# Patient Record
Sex: FEMALE | Race: WHITE | NOT HISPANIC OR LATINO | Employment: UNEMPLOYED | URBAN - METROPOLITAN AREA
[De-identification: names, ages, dates, MRNs, and addresses within clinical notes are randomized per-mention and may not be internally consistent; named-entity substitution may affect disease eponyms.]

---
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
## 2022-05-24 NOTE — MR AVS SNAPSHOT
[facility] [place]  [address] [place] [address]  [place] GOMEZ [address]  Phone: [phone]  Fax: [phone]                  Tamica Ware   2017 10:00 AM   Office Visit    Description:  Female : 2000   Provider:  Meliza Coughlin MD   Department:  [place]  Peds           Reason for Visit     Headache     Fever     Cough     Generalized Body Aches           Diagnoses this Visit        Comments    Influenza A    -  Primary            To Do List           Future Appointments        Provider Department Dept Phone    2017 3:30 PM Hossein Jordan Jr., MD [facility] - Pediatric Oncology [phone]      Goals (5 Years of Data)     None       These Medications        Disp Refills Start End    oseltamivir (TAMIFLU) 75 MG capsule 20 capsule 0 2017 2/3/2017    Take 1 capsule (75 mg total) by mouth 2 (two) times daily. - Oral    Pharmacy: [facility]/pharmacy #[id number] - [place] - [address] [facility] #: [phone]         [facility]     [facility] -  Assistance  Registered nurses in the [facility] On Call Center provide clinical advisement, health education, appointment booking, and other advisory services.  Call for this free service at [phone].             Medications           Message regarding Medications     Verify the changes and/or additions to your medication regime listed below are the same as discussed with your clinician today.  If any of these changes or additions are incorrect, please notify your healthcare provider.        START taking these NEW medications        Refills    oseltamivir (TAMIFLU) 75 MG capsule 0    Sig: Take 1 capsule (75 mg total) by mouth 2 (two) times daily.    Class: Normal    Route: Oral           Verify that the below list of medications is an accurate representation of the medications you are currently taking.  If none reported, the list may be blank. If incorrect, please contact your healthcare provider. Carry this  "list with you in case of emergency.           Current Medications     cetirizine (ZYRTEC) 1 mg/mL syrup Take 10 mLs (10 mg total) by mouth once daily.    dextroamphetamine-amphetamine (ADDERALL XR) 20 MG 24 hr capsule Take 1 capsule (20 mg total) by mouth every morning.    ferrous sulfate 325 mg (65 mg iron) Tab tablet Take 1 tablet (325 mg total) by mouth daily with breakfast.    mupirocin (BACTROBAN) 2 % ointment Apply to affected skin bid-tid    oseltamivir (TAMIFLU) 75 MG capsule Take 1 capsule (75 mg total) by mouth 2 (two) times daily.           Clinical Reference Information           Vital Signs - Last Recorded  Most recent update: 1/24/2017 10:16 AM by Marie Fleming MA    Temp Ht Wt LMP BMI    98.4 °F (36.9 °C) (Oral) 5' 7.05" (1.703 m) (93 %, Z= 1.45)* 59.6 kg (131 lb 6.4 oz) (80 %, Z= 0.86)* 12/29/2016 20.55 kg/m2 (64 %, Z= 0.35)*    *Growth percentiles are based on CDC 2-20 Years data.      Allergies as of 1/24/2017     No Known Allergies      Immunizations Administered on Date of Encounter - 1/24/2017     None      [facility] Proxy Access     For Parents with an Active [facility] Account, Getting Proxy Access to Your Child's Record is Easy!     Ask your provider's office to meliza you access.    Or     1) Sign into your [facility] account.    2) Access the Pediatric Proxy Request form under My Account --> Personalize.    3) Fill out the form, and e-mail it to [email or web address], fax it to [phone], or mail it to [facility], [employer], [facility] 1st Floor, [address] [place] [address].      Don't have a [facility] account? Go to [email or web address], and click New User.     Additional Information  If you have questions, please e-mail [email or web address] or call [phone] to talk to our [facility] staff. Remember, [facility] is NOT to be used for urgent needs. For medical emergencies, dial 911.         Instructions      Influenza (Child)    Influenza is also called the flu. It is a " viral illness that affects the air passages of your lungs. It is different from the common cold. The flu can easily be passed from one to person to another. It may be spread through the air by coughing and sneezing. Or it can be spread by touching the sick person and then touching your own eyes, nose, or mouth.  Symptoms of the flu may be mild or severe. They can include extreme tiredness (wanting to stay in bed all day), chills, fevers, muscle aches, soreness with eye movement, headache, and a dry, hacking cough.  Your child usually wont need to take antibiotics, unless he or she has a complication. This might be an ear or sinus infection or pneumonia.  Home care  Follow these guidelines when caring for your child at home:  · Fluids. Fever increases the amount of water your child loses from his or her body. For babies younger than 1 year old, keep giving regular feedings (formula or breast). Talk with your childs healthcare provider to find out how much fluid your baby should be getting. If needed, give an oral rehydration solution. You can buy this at the grocery or drugstore without a prescription. For a child older than 1 year, give him or her more fluids and continue his or her normal diet. If your child is dehydrated, give an oral rehydration. Go back to your childs normal diet as soon as possible. If your child has diarrhea, dont give juice, flavored gelatin water, soft drinks without caffeine, lemonade, fruit drinks, or popsicles. This may make diarrhea worse.  · Food. If your child doesnt want to eat solid foods, its OK for a few days. Make sure your child drinks lots of fluid and has a normal amount of urine.  · Activity. Keep children with fever at home resting or playing quietly. Encourage your child to take naps. Your child may go back to  or school when the fever is gone for at least 24 hours. The fever should be gone without giving your child acetaminophen or other medicine to reduce  fever. Your child should also be eating well and feeling better.  · Sleep. Its normal for your child to be unable to sleep or be irritable if he or she has the flu. A child who has congestion will sleep best with his or her head and upper body raised up. Or you can raise the head of the bed frame on a 6-inch block.  · Cough. Coughing is a normal part of the flu. You can use a cool mist humidifier at the bedside. Dont give over-the-counter cough and cold medicines to children younger than 6 years of age, unless the healthcare provider tells you to do so. These medicines dont help ease symptoms. And they can cause serious side effects, especially in babies younger than 2 years of age. Dont allow anyone to smoke around your child. Smoke can make the cough worse.  · Nasal congestion. Use a rubber bulb syringe to suction the nose of a baby. You may put 2 to 3 drops of saltwater (saline) nose drops in each nostril before suctioning. This will help remove secretions. You can buy saline nose drops without a prescription. You can make the drops yourself by adding 1/4 teaspoon table salt to 1 cup of water.  · Fever. Use acetaminophen to control pain, unless another medicine was prescribed. In infants older than 6 months of age, you may use ibuprofen instead of acetaminophen. If your child has chronic liver or kidney disease, talk with your childs provider before using these medicines. Also talk with the provider if your child has ever had a stomach ulcer or GI bleeding. Dont give aspirin to anyone under 18 years of age who is ill with a fever. It may cause severe liver damage.  Follow-up care  Follow up with your childs health care provider, or as advised.  When to seek medical advice  Call your childs healthcare provider right away if any of these occur:  · Your child is younger than 12 weeks old and has a fever of 100.4°F (38°C) or higher. Your baby may need to be seen by a healthcare provider.  · Your child has  "repeated fevers above 104°F (40°C) at any age.  · Your child is younger than 2 years old and his or her fever continues for more than 24 hours. Or your child is 2 years old or older and his or her fever continues for more than 3 days.  · Fast breathing. In a child 6 weeks to 2 years, this is more than 45 breaths per minute. In a child 3 to 6 years, this is more than 35 breaths per minute. In a child 7 to 10 years, this is more than 30 breaths per minute. In a child older than 10 years, this is more than  25 breaths per minute.  · Earache, sinus pain, stiff or painful neck, headache, or repeated diarrhea or vomiting  · Unusual fussiness, drowsiness, or confusion  · Your child doesnt interact with you as he or she normally does  · Your child doesnt want to be held  · Not drinking enough fluid. This may show as no tears when crying, or "sunken" eyes or dry mouth. It may also be no wet diapers for 8 hours in a baby. Or it may be less urine than usual in older children.  · Rash with fever  © 3461-3172 [employer]. [address] [place] [address], [place] [address]. All rights reserved. This information is not intended as a substitute for professional medical care. Always follow your healthcare professional's instructions.             " no ear pain/no tinnitus/no vertigo/no nose bleeds/no gum bleeding/no dry mouth/no throat pain no ear pain/no tinnitus/no vertigo/no nose bleeds/no gum bleeding/no dry mouth/no throat pain no ear pain/no tinnitus/no vertigo/no nose bleeds/no gum bleeding/no dry mouth/no throat pain no ear pain/no tinnitus/no vertigo/no nose bleeds/no gum bleeding/no dry mouth/no throat pain no ear pain/no tinnitus/no vertigo/no nose bleeds/no gum bleeding/no dry mouth/no throat pain no ear pain/no tinnitus/no vertigo/no nose bleeds/no gum bleeding/no dry mouth/no throat pain no ear pain/no tinnitus/no vertigo/no nose bleeds/no gum bleeding/no dry mouth/no throat pain no ear pain/no tinnitus/no vertigo/no nose bleeds/no gum bleeding/no dry mouth/no throat pain no ear pain/no tinnitus/no vertigo/no nose bleeds/no gum bleeding/no dry mouth/no throat pain no ear pain/no tinnitus/no vertigo/no nose bleeds/no gum bleeding/no dry mouth/no throat pain no ear pain/no tinnitus/no vertigo/no nose bleeds/no gum bleeding/no dry mouth/no throat pain no ear pain/no tinnitus/no vertigo/no nose bleeds/no gum bleeding/no dry mouth/no throat pain

## 2022-08-10 ENCOUNTER — TELEPHONE (OUTPATIENT)
Dept: OBSTETRICS AND GYNECOLOGY | Facility: CLINIC | Age: 22
End: 2022-08-10
Payer: MEDICAID

## 2022-08-10 NOTE — TELEPHONE ENCOUNTER
----- Message from Jesica Sharma sent at 8/10/2022 10:00 AM CDT -----  Good morning,    The patient has a STAT referral from the emergency department with a diagnosis of Chronic ITP (idiopathic thrombocytopenia)/Less than 8 weeks gestation of pregnancy. Please assist with scheduling the patient?    Thank you

## 2022-08-11 ENCOUNTER — TELEPHONE (OUTPATIENT)
Dept: MATERNAL FETAL MEDICINE | Facility: CLINIC | Age: 22
End: 2022-08-11
Payer: MEDICAID

## 2022-08-11 NOTE — TELEPHONE ENCOUNTER
Message left for pt to call MFM clinic at [phone] regarding consult.     Patient will need to establish OB care before MFM consult is scheduled.

## 2022-08-15 ENCOUNTER — TELEPHONE (OUTPATIENT)
Dept: OBSTETRICS AND GYNECOLOGY | Facility: CLINIC | Age: 22
End: 2022-08-15
Payer: MEDICAID

## 2022-08-15 NOTE — TELEPHONE ENCOUNTER
----- Message from Roma Odom sent at 8/15/2022 11:52 AM CDT -----  Type:  Needs Medical Advice    Who Called:  pt  Symptoms (please be specific):  Initial Ob visit possibly 7 weeks Pregnancy confirmed At Urgent care      Would the patient rather a call back or a response via [facility]?  Call   Best Call Back Number:  [phone]  Additional Information:  pt would like to get established with Dr. Espinal if possible and would like to be seen as soon as possible /an appt was scheduled for pt to see Dr. Forrest in [facility] but she prefers to see Dr. Espinal sooner than September visit   Pt said she has chronic ITP and was told that she would possibly be a  High risk pregnancy  for that reason

## 2022-09-20 ENCOUNTER — OFFICE VISIT (OUTPATIENT)
Dept: OBSTETRICS AND GYNECOLOGY | Facility: CLINIC | Age: 22
End: 2022-09-20
Payer: MEDICAID

## 2022-09-20 ENCOUNTER — TELEPHONE (OUTPATIENT)
Dept: OBSTETRICS AND GYNECOLOGY | Facility: CLINIC | Age: 22
End: 2022-09-20
Payer: MEDICAID

## 2022-09-20 ENCOUNTER — PATIENT MESSAGE (OUTPATIENT)
Dept: OBSTETRICS AND GYNECOLOGY | Facility: CLINIC | Age: 22
End: 2022-09-20

## 2022-09-20 VITALS
WEIGHT: 141.13 LBS | DIASTOLIC BLOOD PRESSURE: 74 MMHG | SYSTOLIC BLOOD PRESSURE: 126 MMHG | BODY MASS INDEX: 20.24 KG/M2

## 2022-09-20 DIAGNOSIS — D69.3 CHRONIC ITP (IDIOPATHIC THROMBOCYTOPENIA): Primary | ICD-10-CM

## 2022-09-20 DIAGNOSIS — Z3A.12 12 WEEKS GESTATION OF PREGNANCY: Primary | ICD-10-CM

## 2022-09-20 DIAGNOSIS — D69.3 CHRONIC ITP (IDIOPATHIC THROMBOCYTOPENIA): ICD-10-CM

## 2022-09-20 DIAGNOSIS — Z3A.12 12 WEEKS GESTATION OF PREGNANCY: ICD-10-CM

## 2022-09-20 DIAGNOSIS — F32.A DEPRESSION, UNSPECIFIED DEPRESSION TYPE: ICD-10-CM

## 2022-09-20 DIAGNOSIS — F41.9 ANXIETY: ICD-10-CM

## 2022-09-20 PROCEDURE — 3074F SYST BP LT 130 MM HG: CPT | Mod: CPTII,,, | Performed by: OBSTETRICS & GYNECOLOGY

## 2022-09-20 PROCEDURE — 99204 OFFICE O/P NEW MOD 45 MIN: CPT | Mod: S$PBB,TH,, | Performed by: OBSTETRICS & GYNECOLOGY

## 2022-09-20 PROCEDURE — 1160F RVW MEDS BY RX/DR IN RCRD: CPT | Mod: CPTII,,, | Performed by: OBSTETRICS & GYNECOLOGY

## 2022-09-20 PROCEDURE — 87491 CHLMYD TRACH DNA AMP PROBE: CPT | Mod: 59 | Performed by: OBSTETRICS & GYNECOLOGY

## 2022-09-20 PROCEDURE — 1160F PR REVIEW ALL MEDS BY PRESCRIBER/CLIN PHARMACIST DOCUMENTED: ICD-10-PCS | Mod: CPTII,,, | Performed by: OBSTETRICS & GYNECOLOGY

## 2022-09-20 PROCEDURE — 3008F BODY MASS INDEX DOCD: CPT | Mod: CPTII,,, | Performed by: OBSTETRICS & GYNECOLOGY

## 2022-09-20 PROCEDURE — 3008F PR BODY MASS INDEX (BMI) DOCUMENTED: ICD-10-PCS | Mod: CPTII,,, | Performed by: OBSTETRICS & GYNECOLOGY

## 2022-09-20 PROCEDURE — 3074F PR MOST RECENT SYSTOLIC BLOOD PRESSURE < 130 MM HG: ICD-10-PCS | Mod: CPTII,,, | Performed by: OBSTETRICS & GYNECOLOGY

## 2022-09-20 PROCEDURE — 99204 PR OFFICE/OUTPT VISIT, NEW, LEVL IV, 45-59 MIN: ICD-10-PCS | Mod: S$PBB,TH,, | Performed by: OBSTETRICS & GYNECOLOGY

## 2022-09-20 PROCEDURE — 1159F PR MEDICATION LIST DOCUMENTED IN MEDICAL RECORD: ICD-10-PCS | Mod: CPTII,,, | Performed by: OBSTETRICS & GYNECOLOGY

## 2022-09-20 PROCEDURE — 99999 PR PBB SHADOW E&M-EST. PATIENT-LVL III: ICD-10-PCS | Mod: PBBFAC,,, | Performed by: OBSTETRICS & GYNECOLOGY

## 2022-09-20 PROCEDURE — 1159F MED LIST DOCD IN RCRD: CPT | Mod: CPTII,,, | Performed by: OBSTETRICS & GYNECOLOGY

## 2022-09-20 PROCEDURE — 99213 OFFICE O/P EST LOW 20 MIN: CPT | Mod: PBBFAC,PN | Performed by: OBSTETRICS & GYNECOLOGY

## 2022-09-20 PROCEDURE — 87481 CANDIDA DNA AMP PROBE: CPT | Mod: 59 | Performed by: OBSTETRICS & GYNECOLOGY

## 2022-09-20 PROCEDURE — 87591 N.GONORRHOEAE DNA AMP PROB: CPT | Mod: 59 | Performed by: OBSTETRICS & GYNECOLOGY

## 2022-09-20 PROCEDURE — 3078F DIAST BP <80 MM HG: CPT | Mod: CPTII,,, | Performed by: OBSTETRICS & GYNECOLOGY

## 2022-09-20 PROCEDURE — 3078F PR MOST RECENT DIASTOLIC BLOOD PRESSURE < 80 MM HG: ICD-10-PCS | Mod: CPTII,,, | Performed by: OBSTETRICS & GYNECOLOGY

## 2022-09-20 PROCEDURE — 99999 PR PBB SHADOW E&M-EST. PATIENT-LVL III: CPT | Mod: PBBFAC,,, | Performed by: OBSTETRICS & GYNECOLOGY

## 2022-09-20 PROCEDURE — 87086 URINE CULTURE/COLONY COUNT: CPT | Performed by: OBSTETRICS & GYNECOLOGY

## 2022-09-20 NOTE — PROGRESS NOTES
CC: positive pregnancy test     HPI:   Tamica Ware 21 y.o.  at 12 wga by lmp is here for + UPT. She hasn't seen anyone yet for this pregnancy. She complains of.     OB history: g1     Patient's last menstrual period was 2022.     Past Medical History:   Diagnosis Date    Suicidal ideations     Thrombocytopenia     Urinary tract infection        Past Surgical History:   Procedure Laterality Date    TONSILLECTOMY         Family History   Problem Relation Age of Onset    Breast cancer Neg Hx     Colon cancer Neg Hx     Ovarian cancer Neg Hx        Social History     Socioeconomic History    Marital status: Single   Tobacco Use    Smoking status: Former     Types: Vaping with nicotine     Start date: 2019    Smokeless tobacco: Never   Substance and Sexual Activity    Alcohol use: Never    Drug use: Never    Sexual activity: Yes     Partners: Male   Social History Narrative    Pt lives at home with both parents, sister Lisa and brother Farideh        Not in school at the moment, trying to get into college    1 dog       OB History          1    Para   0    Term   0       0    AB   0    Living   0         SAB   0    IAB   0    Ectopic   0    Multiple   0    Live Births   0                  COMPREHENSIVE GYN HISTORY:  PAP History: Denies abnormal Paps.  Infection History: Denies STDs. Denies PID.  Benign History: Denies uterine fibroids. Denies ovarian cysts. Denies endometriosis.   Cancer History: Denies cervical cancer. Denies uterine cancer or hyperplasia. Denies ovarian cancer. Denies vulvar cancer or pre-cancer. Denies vaginal cancer or pre-cancer. Denies breast cancer. Denies colon cancer.  Sexual Activity History:   reports being sexually active and has had partner(s) who are male.   Menstrual History: Age of menarche: 17 years. Every mon days, flows for 7d days. heavy flow.  Dysmenorrhea History: Reports mod dysmenorrhea.  Contraception: none       ROS:  GENERAL: Denies weight gain  or weight loss. Feeling well overall.   SKIN: Denies rash or lesions.   HEAD: Denies headache.   NODES: Denies enlarged lymph nodes.   CHEST: Denies shortness of breath or chest pain   ABDOMEN: No abdominal pain, constipation, diarrhea, nausea, vomiting or rectal bleeding.   URINARY: No frequency, dysuria, hematuria, or burning on urination.  REPRODUCTIVE: See HPI.   BREASTS: The patient denies pain, lumps, or nipple discharge.   HEMATOLOGIC: No easy bruisability.   MUSCULOSKELETAL: Denies joint pain or back pain.   NEUROLOGIC: Denies weakness.   PSYCHIATRIC: Denies depression, anxiety or mood swings.    PE:   /74   Wt 64 kg (141 lb 1.5 oz)   LMP 06/23/2022   BMI 20.24 kg/m²     APPEARANCE: Well nourished, well developed, in no acute distress.  NECK: Neck symmetric without  thyromegaly.  NODES: No inguinal, cervical lymph node enlargement.  CHEST: Lungs clear to auscultation.  HEART: Regular rate and rhythm, no murmurs, rubs or gallops.  ABDOMEN: Soft. No tenderness or masses. No hernias.  BREASTS: Symmetrical, no skin changes or visible lesions. No palpable masses, nipple discharge or adenopathy bilaterally.  PELVIC:   VULVA: No lesions. Normal female genitalia.  URETHRAL MEATUS: Normal size and location, no lesions, no prolapse.  URETHRA: No masses, tenderness, prolapse or scarring.  VAGINA: Moist and well rugated, no discharge, no significant cystocele or rectocele.  CERVIX: No lesions and discharge.  UTERUS: 12 wk size, regular shape, mobile, non-tender, bladder base nontender.  ADNEXA: No masses or tenderness.        1. 12 weeks gestation of pregnancy    2. Depression, unspecified depression type    3. Anxiety    4. Chronic ITP (idiopathic thrombocytopenia)        Plan:    1. IOB labs and dating US ordered, PNV ordered.   2 see back in 4 weeks.   3. Discussed with patient the way the office works. That we are 1/2 men and women. I will do my best to be there at the delivery but if I can't make it that  there would be another physician there who could care for them.   4. Discussed majority of time people with ITP do well during pregnancy but will need to monitor closely, discussed risks of not being able to do get epidural if low and possible need for plt if need to do surgery and possible need for steroids if counts too low. Would like her to see heme again and MFM. Message sent to see if can continue to f/u with ped heme or if needs adult heme  5. Doing ok with her anxiety, not currently on medications. Discussed there are safe medications if we need them.       Face to Face time with patient: 40 minutes of total time spent on the encounter, which includes face to face time and non-face to face time preparing to see the patient (eg, review of tests), Obtaining and/or reviewing separately obtained history, Documenting clinical information in the electronic or other health record, Independently interpreting results (not separately reported) and communicating results to the patient/family/caregiver, or Care coordination (not separately reported).

## 2022-09-21 ENCOUNTER — TELEPHONE (OUTPATIENT)
Dept: ADMINISTRATIVE | Facility: OTHER | Age: 22
End: 2022-09-21
Payer: MEDICAID

## 2022-09-21 ENCOUNTER — PATIENT MESSAGE (OUTPATIENT)
Dept: OBSTETRICS AND GYNECOLOGY | Facility: CLINIC | Age: 22
End: 2022-09-21
Payer: MEDICAID

## 2022-09-22 LAB
BACTERIA UR CULT: NO GROWTH
C TRACH DNA SPEC QL NAA+PROBE: NOT DETECTED
N GONORRHOEA DNA SPEC QL NAA+PROBE: NOT DETECTED

## 2022-09-23 LAB
BACTERIAL VAGINOSIS DNA: NEGATIVE
CANDIDA GLABRATA DNA: NEGATIVE
CANDIDA KRUSEI DNA: NEGATIVE
CANDIDA RRNA VAG QL PROBE: NEGATIVE
T VAGINALIS RRNA GENITAL QL PROBE: NEGATIVE

## 2022-10-06 ENCOUNTER — PROCEDURE VISIT (OUTPATIENT)
Dept: MATERNAL FETAL MEDICINE | Facility: HOSPITAL | Age: 22
End: 2022-10-06
Payer: MEDICAID

## 2022-10-06 VITALS
DIASTOLIC BLOOD PRESSURE: 80 MMHG | SYSTOLIC BLOOD PRESSURE: 126 MMHG | HEIGHT: 70 IN | WEIGHT: 134.94 LBS | BODY MASS INDEX: 19.32 KG/M2

## 2022-10-06 DIAGNOSIS — Z36.4 ULTRASOUND FOR ANTENATAL SCREENING FOR FETAL GROWTH RESTRICTION: ICD-10-CM

## 2022-10-06 DIAGNOSIS — D69.3 CHRONIC ITP (IDIOPATHIC THROMBOCYTOPENIA): ICD-10-CM

## 2022-10-06 DIAGNOSIS — Z3A.14 14 WEEKS GESTATION OF PREGNANCY: ICD-10-CM

## 2022-10-06 DIAGNOSIS — Z36.89 ENCOUNTER FOR FETAL ANATOMIC SURVEY: Primary | ICD-10-CM

## 2022-10-06 PROCEDURE — 76815 PR  US,PREGNANT UTERUS,LIMITED, 1/> FETUSES: ICD-10-PCS | Mod: 26,S$PBB,, | Performed by: OBSTETRICS & GYNECOLOGY

## 2022-10-06 PROCEDURE — 76815 OB US LIMITED FETUS(S): CPT | Mod: 26,S$PBB,, | Performed by: OBSTETRICS & GYNECOLOGY

## 2022-10-06 PROCEDURE — 99205 PR OFFICE/OUTPT VISIT, NEW, LEVL V, 60-74 MIN: ICD-10-PCS | Mod: 25,TH,S$PBB, | Performed by: OBSTETRICS & GYNECOLOGY

## 2022-10-06 PROCEDURE — 99205 OFFICE O/P NEW HI 60 MIN: CPT | Mod: 25,TH,S$PBB, | Performed by: OBSTETRICS & GYNECOLOGY

## 2022-10-06 PROCEDURE — 76815 OB US LIMITED FETUS(S): CPT | Performed by: OBSTETRICS & GYNECOLOGY

## 2022-10-14 ENCOUNTER — TELEPHONE (OUTPATIENT)
Dept: HEMATOLOGY/ONCOLOGY | Facility: CLINIC | Age: 22
End: 2022-10-14
Payer: MEDICAID

## 2022-10-14 DIAGNOSIS — O99.112 IMMUNE THROMBOCYTOPENIA AFFECTING PREGNANCY IN SECOND TRIMESTER: Primary | ICD-10-CM

## 2022-10-14 DIAGNOSIS — D69.3 IMMUNE THROMBOCYTOPENIA AFFECTING PREGNANCY IN SECOND TRIMESTER: Primary | ICD-10-CM

## 2022-10-18 ENCOUNTER — PATIENT MESSAGE (OUTPATIENT)
Dept: ADMINISTRATIVE | Facility: OTHER | Age: 22
End: 2022-10-18
Payer: MEDICAID

## 2022-10-18 ENCOUNTER — OFFICE VISIT (OUTPATIENT)
Dept: OBSTETRICS AND GYNECOLOGY | Facility: CLINIC | Age: 22
End: 2022-10-18
Payer: MEDICAID

## 2022-10-18 VITALS — BODY MASS INDEX: 19.37 KG/M2 | DIASTOLIC BLOOD PRESSURE: 72 MMHG | SYSTOLIC BLOOD PRESSURE: 107 MMHG | WEIGHT: 135 LBS

## 2022-10-18 DIAGNOSIS — D69.3 CHRONIC ITP (IDIOPATHIC THROMBOCYTOPENIA): ICD-10-CM

## 2022-10-18 DIAGNOSIS — Z3A.16 16 WEEKS GESTATION OF PREGNANCY: Primary | ICD-10-CM

## 2022-10-18 DIAGNOSIS — F32.A DEPRESSION, UNSPECIFIED DEPRESSION TYPE: ICD-10-CM

## 2022-10-18 PROCEDURE — 3078F DIAST BP <80 MM HG: CPT | Mod: CPTII,,, | Performed by: OBSTETRICS & GYNECOLOGY

## 2022-10-18 PROCEDURE — 1160F PR REVIEW ALL MEDS BY PRESCRIBER/CLIN PHARMACIST DOCUMENTED: ICD-10-PCS | Mod: CPTII,,, | Performed by: OBSTETRICS & GYNECOLOGY

## 2022-10-18 PROCEDURE — 99999 PR PBB SHADOW E&M-EST. PATIENT-LVL III: CPT | Mod: PBBFAC,,, | Performed by: OBSTETRICS & GYNECOLOGY

## 2022-10-18 PROCEDURE — 1159F MED LIST DOCD IN RCRD: CPT | Mod: CPTII,,, | Performed by: OBSTETRICS & GYNECOLOGY

## 2022-10-18 PROCEDURE — 1160F RVW MEDS BY RX/DR IN RCRD: CPT | Mod: CPTII,,, | Performed by: OBSTETRICS & GYNECOLOGY

## 2022-10-18 PROCEDURE — 99999 PR PBB SHADOW E&M-EST. PATIENT-LVL III: ICD-10-PCS | Mod: PBBFAC,,, | Performed by: OBSTETRICS & GYNECOLOGY

## 2022-10-18 PROCEDURE — 3074F PR MOST RECENT SYSTOLIC BLOOD PRESSURE < 130 MM HG: ICD-10-PCS | Mod: CPTII,,, | Performed by: OBSTETRICS & GYNECOLOGY

## 2022-10-18 PROCEDURE — 3078F PR MOST RECENT DIASTOLIC BLOOD PRESSURE < 80 MM HG: ICD-10-PCS | Mod: CPTII,,, | Performed by: OBSTETRICS & GYNECOLOGY

## 2022-10-18 PROCEDURE — 99213 PR OFFICE/OUTPT VISIT, EST, LEVL III, 20-29 MIN: ICD-10-PCS | Mod: TH,S$PBB,, | Performed by: OBSTETRICS & GYNECOLOGY

## 2022-10-18 PROCEDURE — 1159F PR MEDICATION LIST DOCUMENTED IN MEDICAL RECORD: ICD-10-PCS | Mod: CPTII,,, | Performed by: OBSTETRICS & GYNECOLOGY

## 2022-10-18 PROCEDURE — 3074F SYST BP LT 130 MM HG: CPT | Mod: CPTII,,, | Performed by: OBSTETRICS & GYNECOLOGY

## 2022-10-18 PROCEDURE — 99213 OFFICE O/P EST LOW 20 MIN: CPT | Mod: PBBFAC,PN | Performed by: OBSTETRICS & GYNECOLOGY

## 2022-10-18 PROCEDURE — 99213 OFFICE O/P EST LOW 20 MIN: CPT | Mod: TH,S$PBB,, | Performed by: OBSTETRICS & GYNECOLOGY

## 2022-10-18 NOTE — PROGRESS NOTES
No c/o today, N/V héctor, some difficulty sleeping   FHT normal   @ 16 3/7 wga  1. IOB labs normal, CM enrolled, doing MT21, anatomy set up     2. ITP: seeing MFM and heme  -monthly CBC until 32 weeks then Q2 weeks until 36 weeks then weekly   -will see anesthesia when in 3rd trimester  -see MFM recommendations for medications/plt If needed     3. Depression/anxiety: stable     Face to Face time with patient: 20 minutes of total time spent on the encounter, which includes face to face time and non-face to face time preparing to see the patient (eg, review of tests), Obtaining and/or reviewing separately obtained history, Documenting clinical information in the electronic or other health record, Independently interpreting results (not separately reported) and communicating results to the patient/family/caregiver, or Care coordination (not separately reported).

## 2022-10-26 NOTE — PROGRESS NOTES
"PATIENT: Tamica Ware  MRN: [id number]  DATE: 10/27/2022    Diagnosis:   1. 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 Microcytosis    3. History of iron deficiency    4. Advance care planning      Chief Complaint: thrombocytopenia    Oncologic History:      Oncologic History     Oncologic Treatment     Pathology       Subjective:    History of Present Illness: Ms. Ware is a 21 y.o. female who presents for evaluation and management of immune thrombocytopenia during pregnancy. She is referred by Dr. Espinal.    - she has previously seen pediatric oncology for there thrombocytopenia. Information per Dr. Jordan's note dated 11/10/20:  " her bone marrow biopsy from 12/05 showed no evidence of leukemia or MDS.    - Her vWF antigen and activity and multimers were all normal.   - At 1st visit, FATUMA was negative, CMV was negative, EBV was positive for IgG but negative for IgM.    - I had suspected a viral/post-viral etiology or possibly autoimmune issue or possibly a primary platelet disorder. No schistocytes or significant anemia to suggest TTP.    - Platelet aggregation studies were not interpretable .   - HIV and Hep C antibody were negative.    - Testing for primary platelet disorder (glycoprotein testing, Type 2N vWF) were normal.   - today, she reports multiple symptoms including joint pain, photophobia and light-headedness.  She reports bruising and petechiae but on exam, had only a few small bruises on lower extremities and a few scattered petechiae  - had an extensive work-up in 2017 including bone marrow, platelet studies and vWD work-up which were negative. 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, chronic ITP  - her platelet count today was 86K with a mild anemia (Hb 11.4) and normal WBC count and differential  - review of platelet count over the last 2 years shows a range of 66 to 101K  - never had significant bleeding after surgery (T and A in 2017), " "dental procedures or piercings   - we again discussed the possibility of an auto-immune disorder  - sent CRP and will follow-up  - she had been referred to rheumatology when I last saw her but did not schedule visit  - I am again  referring to rheumatology (Dr Del Rosario)  - if autoimmune work-up is negative, will consider repeating platelet aggregation studies."      - she is currently 17 weeks pregnant.  - today, she endorses fatigue, nausea, constipation. She denies shortness of breath, chest pain, vomiting, diarrhea.        Past medical, surgical, family, and social histories have been reviewed and updated below.    Past Medical History:   Past Medical History:   Diagnosis Date    Suicidal ideations     Thrombocytopenia     Urinary tract infection        Past Surgical History:   Past Surgical History:   Procedure Laterality Date    TONSILLECTOMY         Family History:   Family History   Problem Relation Age of Onset    Breast cancer Neg Hx     Colon cancer Neg Hx     Ovarian cancer Neg Hx        Social History:  reports that she has quit smoking. Her smoking use included vaping with nicotine. She started smoking about 2 years ago. She has never used smokeless tobacco. She reports that she does not drink alcohol and does not use drugs.    Allergies:  Review of patient's allergies indicates:  No Known Allergies    Medications:  Current Outpatient Medications   Medication Sig Dispense Refill    ondansetron (ZOFRAN-ODT) 4 MG TbDL Take 1 tablet (4 mg total) by mouth every 8 (eight) hours as needed. 14 tablet 1    PNV cmb 52/iron/FA/omega-3/dha (COMPLETE  DHA ORAL) Take by mouth.       No current facility-administered medications for this visit.       Review of Systems   Constitutional:  Positive for fatigue.   HENT:  Negative for sore throat.    Eyes:  Negative for visual disturbance.   Respiratory:  Negative for cough and shortness of breath.    Cardiovascular:  Negative for chest pain.   Gastrointestinal:  " Positive for constipation and nausea. Negative for abdominal pain, diarrhea and vomiting.   Genitourinary:  Negative for dysuria.   Musculoskeletal:  Negative for back pain.   Skin:  Negative for rash.   Neurological:  Negative for headaches.   Hematological:  Negative for adenopathy.   Psychiatric/Behavioral:  The patient is not nervous/anxious.      ECOG Performance Status:   ECOG SCORE 1            Objective:      Vitals:   Vitals:    10/27/22 0927   BP: 113/68   BP Location: Left arm   Patient Position: Sitting   BP Method: Medium (Automatic)   Pulse: 93   Resp: 18   SpO2: 99%   Weight: 62.4 kg (137 lb 9.1 oz)     BMI: Body mass index is 19.74 kg/m².    Physical Exam  Vitals and nursing note reviewed.   Constitutional:       Appearance: She is well-developed.   HENT:      Head: Normocephalic and atraumatic.   Eyes:      Pupils: Pupils are equal, round, and reactive to light.   Cardiovascular:      Rate and Rhythm: Normal rate and regular rhythm.   Pulmonary:      Effort: Pulmonary effort is normal.      Breath sounds: Normal breath sounds.   Abdominal:      General: Bowel sounds are normal.      Palpations: Abdomen is soft.   Musculoskeletal:         General: Normal range of motion.      Cervical back: Normal range of motion and neck supple.   Skin:     General: Skin is warm and dry.   Neurological:      Mental Status: She is alert and oriented to person, place, and time.   Psychiatric:         Behavior: Behavior normal.         Thought Content: Thought content normal.         Judgment: Judgment normal.       Laboratory Data:  Labs have been reviewed.    Lab Results   Component Value Date    WBC 5.56 09/21/2022    HGB 12.8 09/21/2022    HCT 37.5 09/21/2022    MCV 80 (L) 09/21/2022    PLT 67 (L) 09/21/2022           Imaging:    Assessment:       1. 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 17 weeks gestation of pregnancy    3. Microcytosis    4. History of iron deficiency    5. Advance  care planning           Plan:     Thrombocytopenia during pregnancy  - I have reviewed her chart  - labs since 2016 reveal a moderate thrombocytopenia  - she had undergone extensive workup previously with pediatric oncology.  - I feel she has immune thrombocytopenia  - check labs today. We will repeat a CBC monthly. If platelet count decreases to < 20-30 k/uL, we can consider steroids. Closer to delivery, we can give steroids to get platelet count above  k/uL in case she needs an epidural.  - return to clinic in 3 months.    2. Microcytosis / history of iron deficiency  - check iron studies to rule in/out iron deficiency.    3. Advance Care Planning     Date: 10/27/2022    Power of   I initiated the process of advance care planning today and explained the importance of this process to the patient.  I introduced the concept of advance directives to the patient, as well. Then the patient received detailed information about the importance of designating a Health Care Power of  (HCPOA). She was also instructed to communicate with this person about their wishes for future healthcare, should she become sick and lose decision-making capacity. The patient has not previously appointed a HCPOA. After our discussion, the patient has decided to complete a HCPOA and has appointed her  mother-in-law Sunitha Moran ([phone]) and dad Bill Ware ([phone]) . I spent a total time of 16 minutes discussing this issue with the patient.        - return to clinic in 3 months.    Curtis Cope M.D.  Hematology/Oncology  [facility] - [address], [address]  [place] [address]  Phone: [phone]  Fax: [phone]

## 2022-10-27 ENCOUNTER — OFFICE VISIT (OUTPATIENT)
Dept: HEMATOLOGY/ONCOLOGY | Facility: CLINIC | Age: 22
End: 2022-10-27
Payer: MEDICAID

## 2022-10-27 ENCOUNTER — LAB VISIT (OUTPATIENT)
Dept: LAB | Facility: HOSPITAL | Age: 22
End: 2022-10-27
Attending: INTERNAL MEDICINE
Payer: MEDICAID

## 2022-10-27 VITALS
HEART RATE: 93 BPM | WEIGHT: 137.56 LBS | SYSTOLIC BLOOD PRESSURE: 113 MMHG | RESPIRATION RATE: 18 BRPM | BODY MASS INDEX: 19.74 KG/M2 | OXYGEN SATURATION: 99 % | DIASTOLIC BLOOD PRESSURE: 68 MMHG

## 2022-10-27 DIAGNOSIS — Z86.39 HISTORY OF IRON DEFICIENCY: ICD-10-CM

## 2022-10-27 DIAGNOSIS — D69.3 IMMUNE THROMBOCYTOPENIA AFFECTING PREGNANCY IN SECOND TRIMESTER: ICD-10-CM

## 2022-10-27 DIAGNOSIS — R71.8 MICROCYTOSIS: ICD-10-CM

## 2022-10-27 DIAGNOSIS — Z3A.17 17 WEEKS GESTATION OF PREGNANCY: ICD-10-CM

## 2022-10-27 DIAGNOSIS — O99.112 IMMUNE THROMBOCYTOPENIA AFFECTING PREGNANCY IN SECOND TRIMESTER: ICD-10-CM

## 2022-10-27 DIAGNOSIS — O99.112 IMMUNE THROMBOCYTOPENIA AFFECTING PREGNANCY IN SECOND TRIMESTER: Primary | ICD-10-CM

## 2022-10-27 DIAGNOSIS — Z71.89 ADVANCE CARE PLANNING: ICD-10-CM

## 2022-10-27 DIAGNOSIS — D69.3 IMMUNE THROMBOCYTOPENIA AFFECTING PREGNANCY IN SECOND TRIMESTER: Primary | ICD-10-CM

## 2022-10-27 LAB
BASOPHILS # BLD AUTO: 0.04 K/UL (ref 0–0.2)
BASOPHILS NFR BLD: 0.5 % (ref 0–1.9)
DIFFERENTIAL METHOD: ABNORMAL
EOSINOPHIL # BLD AUTO: 0.1 K/UL (ref 0–0.5)
EOSINOPHIL NFR BLD: 0.8 % (ref 0–8)
ERYTHROCYTE [DISTWIDTH] IN BLOOD BY AUTOMATED COUNT: 14.7 % (ref 11.5–14.5)
FERRITIN SERPL-MCNC: 8 NG/ML (ref 20–300)
HCT VFR BLD AUTO: 35.5 % (ref 37–48.5)
HGB BLD-MCNC: 12.2 G/DL (ref 12–16)
IMM GRANULOCYTES # BLD AUTO: 0.05 K/UL (ref 0–0.04)
IMM GRANULOCYTES NFR BLD AUTO: 0.7 % (ref 0–0.5)
IRON SERPL-MCNC: 35 UG/DL (ref 30–160)
LYMPHOCYTES # BLD AUTO: 1.7 K/UL (ref 1–4.8)
LYMPHOCYTES NFR BLD: 23.2 % (ref 18–48)
MCH RBC QN AUTO: 27.8 PG (ref 27–31)
MCHC RBC AUTO-ENTMCNC: 34.4 G/DL (ref 32–36)
MCV RBC AUTO: 81 FL (ref 82–98)
MONOCYTES # BLD AUTO: 0.5 K/UL (ref 0.3–1)
MONOCYTES NFR BLD: 6.4 % (ref 4–15)
NEUTROPHILS # BLD AUTO: 5.1 K/UL (ref 1.8–7.7)
NEUTROPHILS NFR BLD: 68.4 % (ref 38–73)
NRBC BLD-RTO: 0 /100 WBC
PLATELET # BLD AUTO: 83 K/UL (ref 150–450)
PMV BLD AUTO: 12.6 FL (ref 9.2–12.9)
RBC # BLD AUTO: 4.39 M/UL (ref 4–5.4)
SATURATED IRON: 6 % (ref 20–50)
TOTAL IRON BINDING CAPACITY: 585 UG/DL (ref 250–450)
TRANSFERRIN SERPL-MCNC: 395 MG/DL (ref 200–375)
WBC # BLD AUTO: 7.5 K/UL (ref 3.9–12.7)

## 2022-10-27 PROCEDURE — 3078F DIAST BP <80 MM HG: CPT | Mod: CPTII,,, | Performed by: INTERNAL MEDICINE

## 2022-10-27 PROCEDURE — 3078F PR MOST RECENT DIASTOLIC BLOOD PRESSURE < 80 MM HG: ICD-10-PCS | Mod: CPTII,,, | Performed by: INTERNAL MEDICINE

## 2022-10-27 PROCEDURE — 99497 PR ADVNCD CARE PLAN 30 MIN: ICD-10-PCS | Mod: S$PBB,,, | Performed by: INTERNAL MEDICINE

## 2022-10-27 PROCEDURE — 99204 PR OFFICE/OUTPT VISIT, NEW, LEVL IV, 45-59 MIN: ICD-10-PCS | Mod: S$PBB,,, | Performed by: INTERNAL MEDICINE

## 2022-10-27 PROCEDURE — 82728 ASSAY OF FERRITIN: CPT | Performed by: INTERNAL MEDICINE

## 2022-10-27 PROCEDURE — 99999 PR PBB SHADOW E&M-EST. PATIENT-LVL III: ICD-10-PCS | Mod: PBBFAC,,, | Performed by: INTERNAL MEDICINE

## 2022-10-27 PROCEDURE — 85025 COMPLETE CBC W/AUTO DIFF WBC: CPT | Performed by: INTERNAL MEDICINE

## 2022-10-27 PROCEDURE — 99213 OFFICE O/P EST LOW 20 MIN: CPT | Mod: PBBFAC,PO | Performed by: INTERNAL MEDICINE

## 2022-10-27 PROCEDURE — 3074F SYST BP LT 130 MM HG: CPT | Mod: CPTII,,, | Performed by: INTERNAL MEDICINE

## 2022-10-27 PROCEDURE — 99999 PR PBB SHADOW E&M-EST. PATIENT-LVL III: CPT | Mod: PBBFAC,,, | Performed by: INTERNAL MEDICINE

## 2022-10-27 PROCEDURE — 1159F PR MEDICATION LIST DOCUMENTED IN MEDICAL RECORD: ICD-10-PCS | Mod: CPTII,,, | Performed by: INTERNAL MEDICINE

## 2022-10-27 PROCEDURE — 99497 ADVNCD CARE PLAN 30 MIN: CPT | Mod: S$PBB,,, | Performed by: INTERNAL MEDICINE

## 2022-10-27 PROCEDURE — 1159F MED LIST DOCD IN RCRD: CPT | Mod: CPTII,,, | Performed by: INTERNAL MEDICINE

## 2022-10-27 PROCEDURE — 84466 ASSAY OF TRANSFERRIN: CPT | Performed by: INTERNAL MEDICINE

## 2022-10-27 PROCEDURE — 1160F RVW MEDS BY RX/DR IN RCRD: CPT | Mod: CPTII,,, | Performed by: INTERNAL MEDICINE

## 2022-10-27 PROCEDURE — 3074F PR MOST RECENT SYSTOLIC BLOOD PRESSURE < 130 MM HG: ICD-10-PCS | Mod: CPTII,,, | Performed by: INTERNAL MEDICINE

## 2022-10-27 PROCEDURE — 99204 OFFICE O/P NEW MOD 45 MIN: CPT | Mod: S$PBB,,, | Performed by: INTERNAL MEDICINE

## 2022-10-27 PROCEDURE — 36415 COLL VENOUS BLD VENIPUNCTURE: CPT | Performed by: INTERNAL MEDICINE

## 2022-10-27 PROCEDURE — 1160F PR REVIEW ALL MEDS BY PRESCRIBER/CLIN PHARMACIST DOCUMENTED: ICD-10-PCS | Mod: CPTII,,, | Performed by: INTERNAL MEDICINE

## 2022-10-27 PROCEDURE — 99497 ADVNCD CARE PLAN 30 MIN: CPT | Mod: PBBFAC,PO | Performed by: INTERNAL MEDICINE

## 2022-10-28 ENCOUNTER — PATIENT MESSAGE (OUTPATIENT)
Dept: HEMATOLOGY/ONCOLOGY | Facility: CLINIC | Age: 22
End: 2022-10-28
Payer: MEDICAID

## 2022-10-28 ENCOUNTER — TELEPHONE (OUTPATIENT)
Dept: INFUSION THERAPY | Facility: HOSPITAL | Age: 22
End: 2022-10-28
Payer: MEDICAID

## 2022-10-28 DIAGNOSIS — D50.9 IRON DEFICIENCY ANEMIA DURING PREGNANCY: Primary | ICD-10-CM

## 2022-10-28 DIAGNOSIS — O99.019 IRON DEFICIENCY ANEMIA DURING PREGNANCY: Primary | ICD-10-CM

## 2022-10-28 DIAGNOSIS — E61.1 IRON DEFICIENCY: Primary | ICD-10-CM

## 2022-10-28 DIAGNOSIS — E61.1 IRON DEFICIENCY: ICD-10-CM

## 2022-10-28 RX ORDER — HEPARIN 100 UNIT/ML
500 SYRINGE INTRAVENOUS
Status: CANCELLED | OUTPATIENT
Start: 2022-11-03

## 2022-10-28 RX ORDER — HEPARIN 100 UNIT/ML
500 SYRINGE INTRAVENOUS
Status: CANCELLED | OUTPATIENT
Start: 2022-11-10

## 2022-10-28 RX ORDER — SODIUM CHLORIDE 0.9 % (FLUSH) 0.9 %
10 SYRINGE (ML) INJECTION
Status: CANCELLED | OUTPATIENT
Start: 2022-11-17

## 2022-10-28 RX ORDER — HEPARIN 100 UNIT/ML
500 SYRINGE INTRAVENOUS
Status: CANCELLED | OUTPATIENT
Start: 2022-11-24

## 2022-10-28 RX ORDER — HEPARIN 100 UNIT/ML
500 SYRINGE INTRAVENOUS
Status: CANCELLED | OUTPATIENT
Start: 2022-11-17

## 2022-10-28 RX ORDER — SODIUM CHLORIDE 0.9 % (FLUSH) 0.9 %
10 SYRINGE (ML) INJECTION
Status: CANCELLED | OUTPATIENT
Start: 2022-11-10

## 2022-10-28 RX ORDER — SODIUM CHLORIDE 0.9 % (FLUSH) 0.9 %
10 SYRINGE (ML) INJECTION
Status: CANCELLED | OUTPATIENT
Start: 2022-11-03

## 2022-10-28 RX ORDER — SODIUM CHLORIDE 0.9 % (FLUSH) 0.9 %
10 SYRINGE (ML) INJECTION
Status: CANCELLED | OUTPATIENT
Start: 2022-11-24

## 2022-10-28 RX ORDER — FERROUS SULFATE 325(65) MG
325 TABLET ORAL DAILY
Qty: 90 TABLET | Refills: 3 | Status: SHIPPED | OUTPATIENT
Start: 2022-10-28 | End: 2023-10-28

## 2022-10-28 NOTE — TELEPHONE ENCOUNTER
Called pt to schedule iron infusions ordered by Dr. Cope. No answer, left VM message for pt to return call to schedule appointments.

## 2022-10-31 ENCOUNTER — TELEPHONE (OUTPATIENT)
Dept: INFUSION THERAPY | Facility: HOSPITAL | Age: 22
End: 2022-10-31
Payer: MEDICAID

## 2022-10-31 NOTE — TELEPHONE ENCOUNTER
Confirmed infusion appointments with the patient.   11/2 at 1pm  11/9 at 1p  11/16 at 1p  11/23 at 1p

## 2022-11-09 ENCOUNTER — INFUSION (OUTPATIENT)
Dept: INFUSION THERAPY | Facility: HOSPITAL | Age: 22
End: 2022-11-09
Attending: INTERNAL MEDICINE
Payer: MEDICAID

## 2022-11-09 VITALS
OXYGEN SATURATION: 100 % | RESPIRATION RATE: 18 BRPM | TEMPERATURE: 98 F | HEART RATE: 86 BPM | DIASTOLIC BLOOD PRESSURE: 56 MMHG | BODY MASS INDEX: 19.69 KG/M2 | WEIGHT: 137.56 LBS | HEIGHT: 70 IN | SYSTOLIC BLOOD PRESSURE: 103 MMHG

## 2022-11-09 DIAGNOSIS — D50.9 IRON DEFICIENCY ANEMIA DURING PREGNANCY: ICD-10-CM

## 2022-11-09 DIAGNOSIS — O99.019 IRON DEFICIENCY ANEMIA DURING PREGNANCY: ICD-10-CM

## 2022-11-09 DIAGNOSIS — E61.1 IRON DEFICIENCY: Primary | ICD-10-CM

## 2022-11-09 PROCEDURE — 96365 THER/PROPH/DIAG IV INF INIT: CPT

## 2022-11-09 PROCEDURE — 63600175 PHARM REV CODE 636 W HCPCS: Performed by: INTERNAL MEDICINE

## 2022-11-09 PROCEDURE — A4216 STERILE WATER/SALINE, 10 ML: HCPCS | Performed by: INTERNAL MEDICINE

## 2022-11-09 PROCEDURE — 25000003 PHARM REV CODE 250: Performed by: INTERNAL MEDICINE

## 2022-11-09 RX ORDER — SODIUM CHLORIDE 0.9 % (FLUSH) 0.9 %
10 SYRINGE (ML) INJECTION
Status: DISCONTINUED | OUTPATIENT
Start: 2022-11-09 | End: 2022-11-09 | Stop reason: HOSPADM

## 2022-11-09 RX ADMIN — IRON SUCROSE 200 MG: 20 INJECTION, SOLUTION INTRAVENOUS at 01:11

## 2022-11-09 RX ADMIN — Medication 10 ML: at 03:11

## 2022-11-09 RX ADMIN — SODIUM CHLORIDE: 9 INJECTION, SOLUTION INTRAVENOUS at 01:11

## 2022-11-09 NOTE — NURSING
Pt tolerated Venofer infusion 1 of 4 well.  No adverse reaction noted during infusion and 30 min post. IV flushed with NS and D/C per protocol. Patient left clinic in no acute distress.

## 2022-11-10 ENCOUNTER — ROUTINE PRENATAL (OUTPATIENT)
Dept: OBSTETRICS AND GYNECOLOGY | Facility: CLINIC | Age: 22
End: 2022-11-10
Payer: MEDICAID

## 2022-11-10 ENCOUNTER — PROCEDURE VISIT (OUTPATIENT)
Dept: MATERNAL FETAL MEDICINE | Facility: HOSPITAL | Age: 22
End: 2022-11-10
Attending: OBSTETRICS & GYNECOLOGY
Payer: MEDICAID

## 2022-11-10 VITALS — SYSTOLIC BLOOD PRESSURE: 110 MMHG | WEIGHT: 140 LBS | BODY MASS INDEX: 20.09 KG/M2 | DIASTOLIC BLOOD PRESSURE: 70 MMHG

## 2022-11-10 VITALS
WEIGHT: 140 LBS | DIASTOLIC BLOOD PRESSURE: 70 MMHG | SYSTOLIC BLOOD PRESSURE: 110 MMHG | HEIGHT: 70 IN | BODY MASS INDEX: 20.04 KG/M2

## 2022-11-10 DIAGNOSIS — Z36.89 ENCOUNTER FOR FETAL ANATOMIC SURVEY: ICD-10-CM

## 2022-11-10 DIAGNOSIS — O35.03X0 CHOROID PLEXUS CYST OF FETUS AFFECTING CARE OF MOTHER, ANTEPARTUM, SINGLE OR UNSPECIFIED FETUS: ICD-10-CM

## 2022-11-10 DIAGNOSIS — Z3A.19 19 WEEKS GESTATION OF PREGNANCY: ICD-10-CM

## 2022-11-10 DIAGNOSIS — Z3A.19 19 WEEKS GESTATION OF PREGNANCY: Primary | ICD-10-CM

## 2022-11-10 DIAGNOSIS — O99.891 CURRENT MATERNAL CONDITION AFFECTING PREGNANCY: ICD-10-CM

## 2022-11-10 DIAGNOSIS — Z36.89 ENCOUNTER FOR ULTRASOUND TO CHECK FETAL GROWTH: Primary | ICD-10-CM

## 2022-11-10 DIAGNOSIS — D69.3 CHRONIC ITP (IDIOPATHIC THROMBOCYTOPENIA): ICD-10-CM

## 2022-11-10 PROCEDURE — 76811 OB US DETAILED SNGL FETUS: CPT | Performed by: OBSTETRICS & GYNECOLOGY

## 2022-11-10 PROCEDURE — 76811 OB US DETAILED SNGL FETUS: CPT | Mod: 26,,, | Performed by: OBSTETRICS & GYNECOLOGY

## 2022-11-10 PROCEDURE — 76811 PR US, OB FETAL EVAL & EXAM, TRANSABDOM,FIRST GESTATION: ICD-10-PCS | Mod: 26,,, | Performed by: OBSTETRICS & GYNECOLOGY

## 2022-11-10 PROCEDURE — 99212 OFFICE O/P EST SF 10 MIN: CPT | Mod: PBBFAC,PO,25 | Performed by: OBSTETRICS & GYNECOLOGY

## 2022-11-10 PROCEDURE — 99999 PR PBB SHADOW E&M-EST. PATIENT-LVL II: CPT | Mod: PBBFAC,,, | Performed by: OBSTETRICS & GYNECOLOGY

## 2022-11-10 PROCEDURE — 99214 OFFICE O/P EST MOD 30 MIN: CPT | Mod: 25,TH,S$PBB, | Performed by: OBSTETRICS & GYNECOLOGY

## 2022-11-10 PROCEDURE — 99214 PR OFFICE/OUTPT VISIT, EST, LEVL IV, 30-39 MIN: ICD-10-PCS | Mod: 25,TH,S$PBB, | Performed by: OBSTETRICS & GYNECOLOGY

## 2022-11-10 PROCEDURE — 99212 PR OFFICE/OUTPT VISIT, EST, LEVL II, 10-19 MIN: ICD-10-PCS | Mod: TH,S$PBB,, | Performed by: OBSTETRICS & GYNECOLOGY

## 2022-11-10 PROCEDURE — 99212 OFFICE O/P EST SF 10 MIN: CPT | Mod: TH,S$PBB,, | Performed by: OBSTETRICS & GYNECOLOGY

## 2022-11-10 PROCEDURE — 99999 PR PBB SHADOW E&M-EST. PATIENT-LVL II: ICD-10-PCS | Mod: PBBFAC,,, | Performed by: OBSTETRICS & GYNECOLOGY

## 2022-11-10 NOTE — PROGRESS NOTES
No c/o today   FHT normal   @ 19 5/7 wga  1. IOB labs normal, CM enrolled, doing MT21, anatomy set up today with MFM; DM test next visit     2. ITP: seeing MFM and heme  -monthly CBC until 32 weeks then Q2 weeks until 36 weeks then weekly   -will see anesthesia when in 3rd trimester  -see MFM recommendations for medications/plt If needed     3. Depression/anxiety: stable     4. Anemia: getting iron infusions with heme     Face to Face time with patient: 15 minutes of total time spent on the encounter, which includes face to face time and non-face to face time preparing to see the patient (eg, review of tests), Obtaining and/or reviewing separately obtained history, Documenting clinical information in the electronic or other health record, Independently interpreting results (not separately reported) and communicating results to the patient/family/caregiver, or Care coordination (not separately reported).

## 2022-11-16 ENCOUNTER — INFUSION (OUTPATIENT)
Dept: INFUSION THERAPY | Facility: HOSPITAL | Age: 22
End: 2022-11-16
Attending: INTERNAL MEDICINE
Payer: MEDICAID

## 2022-11-16 VITALS
OXYGEN SATURATION: 99 % | TEMPERATURE: 99 F | HEART RATE: 110 BPM | RESPIRATION RATE: 18 BRPM | HEIGHT: 70 IN | SYSTOLIC BLOOD PRESSURE: 121 MMHG | BODY MASS INDEX: 20.04 KG/M2 | WEIGHT: 140 LBS | DIASTOLIC BLOOD PRESSURE: 67 MMHG

## 2022-11-16 DIAGNOSIS — E61.1 IRON DEFICIENCY: Primary | ICD-10-CM

## 2022-11-16 DIAGNOSIS — D50.9 IRON DEFICIENCY ANEMIA DURING PREGNANCY: ICD-10-CM

## 2022-11-16 DIAGNOSIS — O99.019 IRON DEFICIENCY ANEMIA DURING PREGNANCY: ICD-10-CM

## 2022-11-16 PROCEDURE — 25000003 PHARM REV CODE 250: Performed by: INTERNAL MEDICINE

## 2022-11-16 PROCEDURE — 96365 THER/PROPH/DIAG IV INF INIT: CPT

## 2022-11-16 PROCEDURE — 63600175 PHARM REV CODE 636 W HCPCS: Performed by: INTERNAL MEDICINE

## 2022-11-16 PROCEDURE — A4216 STERILE WATER/SALINE, 10 ML: HCPCS | Performed by: INTERNAL MEDICINE

## 2022-11-16 RX ORDER — SODIUM CHLORIDE 0.9 % (FLUSH) 0.9 %
10 SYRINGE (ML) INJECTION
Status: DISCONTINUED | OUTPATIENT
Start: 2022-11-16 | End: 2022-11-16 | Stop reason: HOSPADM

## 2022-11-16 RX ADMIN — Medication 10 ML: at 03:11

## 2022-11-16 RX ADMIN — SODIUM CHLORIDE: 9 INJECTION, SOLUTION INTRAVENOUS at 01:11

## 2022-11-16 RX ADMIN — IRON SUCROSE 200 MG: 20 INJECTION, SOLUTION INTRAVENOUS at 01:11

## 2022-11-23 ENCOUNTER — INFUSION (OUTPATIENT)
Dept: INFUSION THERAPY | Facility: HOSPITAL | Age: 22
End: 2022-11-23
Attending: INTERNAL MEDICINE
Payer: MEDICAID

## 2022-11-23 VITALS
BODY MASS INDEX: 20.04 KG/M2 | DIASTOLIC BLOOD PRESSURE: 55 MMHG | WEIGHT: 140 LBS | SYSTOLIC BLOOD PRESSURE: 104 MMHG | RESPIRATION RATE: 18 BRPM | HEART RATE: 91 BPM | TEMPERATURE: 99 F | OXYGEN SATURATION: 100 % | HEIGHT: 70 IN

## 2022-11-23 DIAGNOSIS — D50.9 IRON DEFICIENCY ANEMIA DURING PREGNANCY: ICD-10-CM

## 2022-11-23 DIAGNOSIS — E61.1 IRON DEFICIENCY: Primary | ICD-10-CM

## 2022-11-23 DIAGNOSIS — O99.019 IRON DEFICIENCY ANEMIA DURING PREGNANCY: ICD-10-CM

## 2022-11-23 PROCEDURE — 63600175 PHARM REV CODE 636 W HCPCS: Performed by: INTERNAL MEDICINE

## 2022-11-23 PROCEDURE — 96365 THER/PROPH/DIAG IV INF INIT: CPT

## 2022-11-23 PROCEDURE — 25000003 PHARM REV CODE 250: Performed by: INTERNAL MEDICINE

## 2022-11-23 PROCEDURE — A4216 STERILE WATER/SALINE, 10 ML: HCPCS | Performed by: INTERNAL MEDICINE

## 2022-11-23 RX ORDER — SODIUM CHLORIDE 0.9 % (FLUSH) 0.9 %
10 SYRINGE (ML) INJECTION
Status: DISCONTINUED | OUTPATIENT
Start: 2022-11-23 | End: 2022-11-23 | Stop reason: HOSPADM

## 2022-11-23 RX ADMIN — IRON SUCROSE 200 MG: 20 INJECTION, SOLUTION INTRAVENOUS at 01:11

## 2022-11-23 RX ADMIN — SODIUM CHLORIDE: 9 INJECTION, SOLUTION INTRAVENOUS at 01:11

## 2022-11-23 RX ADMIN — Medication 10 ML: at 02:11

## 2022-11-23 NOTE — NURSING
Pt received IV Venofer infusion dose 3/4. Pt tolerated it well. AVS given. Next appt scheduled. Discharged with no acute distress.

## 2022-11-30 ENCOUNTER — INFUSION (OUTPATIENT)
Dept: INFUSION THERAPY | Facility: HOSPITAL | Age: 22
End: 2022-11-30
Attending: INTERNAL MEDICINE
Payer: MEDICAID

## 2022-11-30 VITALS
HEART RATE: 80 BPM | HEIGHT: 70 IN | DIASTOLIC BLOOD PRESSURE: 57 MMHG | SYSTOLIC BLOOD PRESSURE: 102 MMHG | WEIGHT: 140 LBS | BODY MASS INDEX: 20.04 KG/M2 | RESPIRATION RATE: 17 BRPM | OXYGEN SATURATION: 100 % | TEMPERATURE: 98 F

## 2022-11-30 DIAGNOSIS — D50.9 IRON DEFICIENCY ANEMIA DURING PREGNANCY: ICD-10-CM

## 2022-11-30 DIAGNOSIS — O99.019 IRON DEFICIENCY ANEMIA DURING PREGNANCY: ICD-10-CM

## 2022-11-30 DIAGNOSIS — E61.1 IRON DEFICIENCY: Primary | ICD-10-CM

## 2022-11-30 PROCEDURE — 96365 THER/PROPH/DIAG IV INF INIT: CPT

## 2022-11-30 PROCEDURE — 63600175 PHARM REV CODE 636 W HCPCS: Performed by: INTERNAL MEDICINE

## 2022-11-30 PROCEDURE — 25000003 PHARM REV CODE 250: Performed by: INTERNAL MEDICINE

## 2022-11-30 PROCEDURE — A4216 STERILE WATER/SALINE, 10 ML: HCPCS | Performed by: INTERNAL MEDICINE

## 2022-11-30 RX ORDER — HEPARIN 100 UNIT/ML
500 SYRINGE INTRAVENOUS
Status: DISCONTINUED | OUTPATIENT
Start: 2022-11-30 | End: 2022-11-30 | Stop reason: HOSPADM

## 2022-11-30 RX ORDER — SODIUM CHLORIDE 0.9 % (FLUSH) 0.9 %
10 SYRINGE (ML) INJECTION
Status: DISCONTINUED | OUTPATIENT
Start: 2022-11-30 | End: 2022-11-30 | Stop reason: HOSPADM

## 2022-11-30 RX ADMIN — IRON SUCROSE 200 MG: 20 INJECTION, SOLUTION INTRAVENOUS at 12:11

## 2022-11-30 RX ADMIN — SODIUM CHLORIDE: 9 INJECTION, SOLUTION INTRAVENOUS at 12:11

## 2022-11-30 RX ADMIN — Medication 10 ML: at 01:11

## 2022-11-30 NOTE — NURSING
Pt tolerated venofer4/4 infusion well.  No adverse reaction noted.   IV flushed with NS and D/C per protocol.  Patient left clinic in no acute distress.

## 2022-12-15 ENCOUNTER — TELEPHONE (OUTPATIENT)
Dept: MATERNAL FETAL MEDICINE | Facility: CLINIC | Age: 22
End: 2022-12-15
Payer: MEDICAID

## 2022-12-15 NOTE — TELEPHONE ENCOUNTER
Called patient because she cancelled M appointment today.  Offered patient Faith next week but declined.  Patient took Jan 05 at [place].

## 2022-12-17 ENCOUNTER — PATIENT MESSAGE (OUTPATIENT)
Dept: OBSTETRICS AND GYNECOLOGY | Facility: CLINIC | Age: 22
End: 2022-12-17
Payer: MEDICAID

## 2022-12-19 ENCOUNTER — TELEPHONE (OUTPATIENT)
Dept: OBSTETRICS AND GYNECOLOGY | Facility: CLINIC | Age: 22
End: 2022-12-19
Payer: MEDICAID

## 2022-12-27 ENCOUNTER — PATIENT MESSAGE (OUTPATIENT)
Dept: HEMATOLOGY/ONCOLOGY | Facility: CLINIC | Age: 22
End: 2022-12-27
Payer: MEDICAID

## 2023-01-03 ENCOUNTER — ROUTINE PRENATAL (OUTPATIENT)
Dept: OBSTETRICS AND GYNECOLOGY | Facility: CLINIC | Age: 23
End: 2023-01-03
Payer: MEDICAID

## 2023-01-03 VITALS — DIASTOLIC BLOOD PRESSURE: 71 MMHG | WEIGHT: 151.25 LBS | SYSTOLIC BLOOD PRESSURE: 106 MMHG | BODY MASS INDEX: 21.7 KG/M2

## 2023-01-03 DIAGNOSIS — D69.3 CHRONIC ITP (IDIOPATHIC THROMBOCYTOPENIA): ICD-10-CM

## 2023-01-03 DIAGNOSIS — Z3A.27 27 WEEKS GESTATION OF PREGNANCY: Primary | ICD-10-CM

## 2023-01-03 PROCEDURE — 99212 OFFICE O/P EST SF 10 MIN: CPT | Mod: PBBFAC,PN | Performed by: OBSTETRICS & GYNECOLOGY

## 2023-01-03 PROCEDURE — 99999 PR PBB SHADOW E&M-EST. PATIENT-LVL II: ICD-10-PCS | Mod: PBBFAC,,, | Performed by: OBSTETRICS & GYNECOLOGY

## 2023-01-03 PROCEDURE — 99213 PR OFFICE/OUTPT VISIT, EST, LEVL III, 20-29 MIN: ICD-10-PCS | Mod: TH,S$PBB,, | Performed by: OBSTETRICS & GYNECOLOGY

## 2023-01-03 PROCEDURE — 99999 PR PBB SHADOW E&M-EST. PATIENT-LVL II: CPT | Mod: PBBFAC,,, | Performed by: OBSTETRICS & GYNECOLOGY

## 2023-01-03 PROCEDURE — 99213 OFFICE O/P EST LOW 20 MIN: CPT | Mod: TH,S$PBB,, | Performed by: OBSTETRICS & GYNECOLOGY

## 2023-01-03 NOTE — PROGRESS NOTES
No c/o today   FHT normal   @ 27 3/7  1. IOB labs normal, CM enrolled, doing MT21, in complete, repeat set up with MFM; DM test and 3rd trim labs with next visit; consents done 1/3     2. ITP: seeing MFM and heme  -monthly CBC until 32 weeks then Q2 weeks until 36 weeks then weekly   - understands there is a change she may not be able to get epidural, will see anesthesia when in 3rd trimester, going to see this thursday  -see MFM recommendations for medications/plt If needed     3. Depression/anxiety: stable     4. Anemia: getting iron infusions with heme     5. Bilateral choroid plexus cysts: hasn't done MT21 yet but has paper    -[facility]     Face to Face time with patient: 20 minutes of total time spent on the encounter, which includes face to face time and non-face to face time preparing to see the patient (eg, review of tests), Obtaining and/or reviewing separately obtained history, Documenting clinical information in the electronic or other health record, Independently interpreting results (not separately reported) and communicating results to the patient/family/caregiver, or Care coordination (not separately reported).

## 2023-01-05 ENCOUNTER — OFFICE VISIT (OUTPATIENT)
Dept: MATERNAL FETAL MEDICINE | Facility: CLINIC | Age: 23
End: 2023-01-05
Payer: MEDICAID

## 2023-01-05 ENCOUNTER — PROCEDURE VISIT (OUTPATIENT)
Dept: MATERNAL FETAL MEDICINE | Facility: CLINIC | Age: 23
End: 2023-01-05
Payer: MEDICAID

## 2023-01-05 VITALS
SYSTOLIC BLOOD PRESSURE: 100 MMHG | DIASTOLIC BLOOD PRESSURE: 60 MMHG | WEIGHT: 151.69 LBS | BODY MASS INDEX: 21.72 KG/M2 | HEIGHT: 70 IN

## 2023-01-05 DIAGNOSIS — D69.3 IDIOPATHIC THROMBOCYTOPENIC PURPURA (ITP): ICD-10-CM

## 2023-01-05 DIAGNOSIS — Z36.89 ENCOUNTER FOR ULTRASOUND TO CHECK FETAL GROWTH: Primary | ICD-10-CM

## 2023-01-05 DIAGNOSIS — D69.3 CHRONIC ITP (IDIOPATHIC THROMBOCYTOPENIA): ICD-10-CM

## 2023-01-05 DIAGNOSIS — Z3A.27 27 WEEKS GESTATION OF PREGNANCY: ICD-10-CM

## 2023-01-05 DIAGNOSIS — O99.891 CURRENT MATERNAL CONDITION AFFECTING PREGNANCY: ICD-10-CM

## 2023-01-05 DIAGNOSIS — O99.012 MATERNAL IRON DEFICIENCY ANEMIA COMPLICATING PREGNANCY IN SECOND TRIMESTER: ICD-10-CM

## 2023-01-05 DIAGNOSIS — Z36.89 ENCOUNTER FOR ULTRASOUND TO CHECK FETAL GROWTH: ICD-10-CM

## 2023-01-05 DIAGNOSIS — D50.9 MATERNAL IRON DEFICIENCY ANEMIA COMPLICATING PREGNANCY IN SECOND TRIMESTER: ICD-10-CM

## 2023-01-05 DIAGNOSIS — Z36.2 ENCOUNTER FOR FOLLOW-UP ULTRASOUND OF FETAL ANATOMY: ICD-10-CM

## 2023-01-05 PROCEDURE — 3074F PR MOST RECENT SYSTOLIC BLOOD PRESSURE < 130 MM HG: ICD-10-PCS | Mod: CPTII,,, | Performed by: OBSTETRICS & GYNECOLOGY

## 2023-01-05 PROCEDURE — 76816 OB US FOLLOW-UP PER FETUS: CPT | Mod: PBBFAC,PO | Performed by: OBSTETRICS & GYNECOLOGY

## 2023-01-05 PROCEDURE — 99214 PR OFFICE/OUTPT VISIT, EST, LEVL IV, 30-39 MIN: ICD-10-PCS | Mod: 25,S$PBB,TH, | Performed by: OBSTETRICS & GYNECOLOGY

## 2023-01-05 PROCEDURE — 1159F MED LIST DOCD IN RCRD: CPT | Mod: CPTII,,, | Performed by: OBSTETRICS & GYNECOLOGY

## 2023-01-05 PROCEDURE — 76816 OB US FOLLOW-UP PER FETUS: CPT | Mod: 26,S$PBB,, | Performed by: OBSTETRICS & GYNECOLOGY

## 2023-01-05 PROCEDURE — 3078F DIAST BP <80 MM HG: CPT | Mod: CPTII,,, | Performed by: OBSTETRICS & GYNECOLOGY

## 2023-01-05 PROCEDURE — 3008F BODY MASS INDEX DOCD: CPT | Mod: CPTII,,, | Performed by: OBSTETRICS & GYNECOLOGY

## 2023-01-05 PROCEDURE — 76816 PR  US,PREGNANT UTERUS,F/U,TRANSABD APP: ICD-10-PCS | Mod: 26,S$PBB,, | Performed by: OBSTETRICS & GYNECOLOGY

## 2023-01-05 PROCEDURE — 3078F PR MOST RECENT DIASTOLIC BLOOD PRESSURE < 80 MM HG: ICD-10-PCS | Mod: CPTII,,, | Performed by: OBSTETRICS & GYNECOLOGY

## 2023-01-05 PROCEDURE — 99212 OFFICE O/P EST SF 10 MIN: CPT | Mod: PBBFAC,TH,PO | Performed by: OBSTETRICS & GYNECOLOGY

## 2023-01-05 PROCEDURE — 1159F PR MEDICATION LIST DOCUMENTED IN MEDICAL RECORD: ICD-10-PCS | Mod: CPTII,,, | Performed by: OBSTETRICS & GYNECOLOGY

## 2023-01-05 PROCEDURE — 99214 OFFICE O/P EST MOD 30 MIN: CPT | Mod: 25,S$PBB,TH, | Performed by: OBSTETRICS & GYNECOLOGY

## 2023-01-05 PROCEDURE — 99999 PR PBB SHADOW E&M-EST. PATIENT-LVL II: CPT | Mod: PBBFAC,,, | Performed by: OBSTETRICS & GYNECOLOGY

## 2023-01-05 PROCEDURE — 99999 PR PBB SHADOW E&M-EST. PATIENT-LVL II: ICD-10-PCS | Mod: PBBFAC,,, | Performed by: OBSTETRICS & GYNECOLOGY

## 2023-01-05 PROCEDURE — 3074F SYST BP LT 130 MM HG: CPT | Mod: CPTII,,, | Performed by: OBSTETRICS & GYNECOLOGY

## 2023-01-05 PROCEDURE — 3008F PR BODY MASS INDEX (BMI) DOCUMENTED: ICD-10-PCS | Mod: CPTII,,, | Performed by: OBSTETRICS & GYNECOLOGY

## 2023-01-05 NOTE — PROGRESS NOTES
"  MATERNAL-FETAL MEDICINE   FOLLOW UP NOTE      SUBJECTIVE:     Ms. Tamica Ware is a 22 y.o.  female with IUP at 27w5d who is seen in consultation by MFM for evaluation and management of:  Problem   Maternal Iron Deficiency Anemia Complicating Pregnancy in Second Trimester   Chronic Itp (Idiopathic Thrombocytopenia)       Previous notes reviewed.   No changes to medical, surgical, family, social, or obstetric history.    Medications:  Current Outpatient Medications   Medication Instructions    ferrous sulfate (FEOSOL) 325 mg, Oral, Daily    ondansetron (ZOFRAN-ODT) 4 mg, Oral, Every 8 hours PRN    PNV cmb 52/iron/FA/omega-3/dha (COMPLETE YUNG DHA ORAL) Oral         Objective:   /60   Ht 5' 10" (1.778 m)   Wt 68.8 kg (151 lb 10.8 oz)   LMP 2022   BMI 21.76 kg/m²     Ultrasound performed. See viewpoint for full ultrasound report.  1. 27w 5d cerda intrauterine pregnancy  2. Interval fetal growth wnl (EFW = 1081 gms at 41% and AC 43%)  3. No fetal structural abnormalities appreciated for organs evaluated today:               3-v trach, ductal arch suboptimal   4. Amniotic fluid volume wnl per MVP 5.0 cm     Significant labs/imagin22: platelets 68k    ASSESSMENT/PLAN:     22 y.o.  female with IUP at 27w5d     Chronic ITP (idiopathic thrombocytopenia)  ITP in pregnancy:  Hematology Dr. Cope managing   22: Platelets 68k    Hematology monitoring platelets about every month    Patient has appt with Anesthesia today    Patient counseled on continued monitoring of her platelets per Hematology. She was counseled that later in the pregnancy she may need medical therapy to raise her platelets in anticipation of delivery (in particular for epidural). Patient's questions were answered.     Maternal iron deficiency anemia complicating pregnancy in second trimester  Patient with iron deficiency anemia:  Receiving IV infusion per Hematology   22:  Hb 13.2  hct " 38.7    Patient counseled that Hb/HCT to be monitored/treated per Hematology. Patient's hb/Hct at present reassuring.       Recommendation/follow up:  -appt with Hematology Dr. Cope on 23    -ultrasound and RT HAILEY MD visit with HAILEY on 23    -Today, appt with anesthesia     -Recommendations for ITP  Recommendations:  Check CBC every 4-6 weeks; increase frequency of evaluation at 32 weeks to every 2 weeks, and weekly at 36 weeks and beyond:                (To be ordered by Hematology and management per Hematology)      Continue care with Hematology/Oncology    If platelet count is > 50 K/?Land patient is asymptomatic, no treatment is usually indicated    Treatment is indicated:  For patients with a platelet count is < 30 K/?L   For patients who have symptomatic bleeding  For patients with a platelet count < 50 K/?L who are anticipated to undergo a  delivery  For patients with platelet count < 70 - 80 K/?L around the time of delivery to increase the likelihood of being able to safely receive neuraxial anesthesia     Anesthesia consultation is recommended around 30-32 weeks gestation:  Patient has an appt today     Platelet transfusion should be administered:  For patients with a platelet count < 30 K/?L and undergoing vaginal delivery  For patients experiencing significant bleeding  For patients with a platelet count < 50 K/?L and undergoing  delivery  Treatment options:  First line: prednisone 0.5 - 2 mg/kg/day (response is usually seen within 4-14 days)/ alternative is dexamethasone burst (40 mg POD daily x 4 days)  Stress dosed steroids are recommended at time of delivery in patients receiving prednisone ? 10 mg daily (or steroid equivalent) for ? 21 days   IVIG can be considered for refractory cases or when rapid increase is necessary in consultation with Hematology/Oncology  Splenectomy can be considered, if deemed appropriate by Hematology/Oncology (this is best accomplished in  second trimester)  Ensure appropriate vaccinations (for pneumococcus, HIB, and meningococcus)    Avoid NSAIDs and aspirin (if patient is candidate for low dose aspirin therapy for preeclampsia risk reduction, discuss with Hematology/Oncology)    If candidate for anticoagulation (ie prophylactic lovenox), discuss with Hematology/Oncology    Check CBC on admission to L&D; type and crossmatch for prbc and transfuse platelets, if appropriate, as above    Mode of delivery per obstetric indications; however, would avoid use of fetal scalp electrode and avoid operative vaginal delivery (particularly vacuum assisted)    Notify pediatrics of maternal condition after birth as  will need platelet count checked and circumcision of male infant may need to be deferred       About 30 minutes of total time spent on the encounter, which includes face to face time and non-face to face time preparing to see the patient (eg, review of tests), obtaining and/or reviewing separately obtained history, documenting clinical information in the electronic or other health record, independently interpreting results (not separately reported) and communicating results to the patient/family/caregiver, or care coordination (not separately reported).      Kylee Rosario  Maternal-Fetal Medicine    Electronically Signed by Kylee Rosario 2023

## 2023-01-05 NOTE — ASSESSMENT & PLAN NOTE
ITP in pregnancy:  Hematology Dr. Cope managing   12-27-22: Platelets 68k    Hematology monitoring platelets about every month    Patient has appt with Anesthesia today

## 2023-01-07 ENCOUNTER — PATIENT MESSAGE (OUTPATIENT)
Dept: OTHER | Facility: OTHER | Age: 23
End: 2023-01-07
Payer: MEDICAID

## 2023-01-11 ENCOUNTER — PATIENT MESSAGE (OUTPATIENT)
Dept: OBSTETRICS AND GYNECOLOGY | Facility: CLINIC | Age: 23
End: 2023-01-11
Payer: MEDICAID

## 2023-01-17 ENCOUNTER — ROUTINE PRENATAL (OUTPATIENT)
Dept: OBSTETRICS AND GYNECOLOGY | Facility: CLINIC | Age: 23
End: 2023-01-17
Payer: MEDICAID

## 2023-01-17 VITALS — BODY MASS INDEX: 22.24 KG/M2 | SYSTOLIC BLOOD PRESSURE: 103 MMHG | DIASTOLIC BLOOD PRESSURE: 64 MMHG | WEIGHT: 155 LBS

## 2023-01-17 DIAGNOSIS — Z3A.29 29 WEEKS GESTATION OF PREGNANCY: Primary | ICD-10-CM

## 2023-01-17 DIAGNOSIS — D69.3 CHRONIC ITP (IDIOPATHIC THROMBOCYTOPENIA): ICD-10-CM

## 2023-01-17 PROCEDURE — 99212 OFFICE O/P EST SF 10 MIN: CPT | Mod: TH,S$PBB,, | Performed by: OBSTETRICS & GYNECOLOGY

## 2023-01-17 PROCEDURE — 99999 PR PBB SHADOW E&M-EST. PATIENT-LVL I: ICD-10-PCS | Mod: PBBFAC,,, | Performed by: OBSTETRICS & GYNECOLOGY

## 2023-01-17 PROCEDURE — 99999 PR PBB SHADOW E&M-EST. PATIENT-LVL I: CPT | Mod: PBBFAC,,, | Performed by: OBSTETRICS & GYNECOLOGY

## 2023-01-17 PROCEDURE — 99212 PR OFFICE/OUTPT VISIT, EST, LEVL II, 10-19 MIN: ICD-10-PCS | Mod: TH,S$PBB,, | Performed by: OBSTETRICS & GYNECOLOGY

## 2023-01-17 PROCEDURE — 99211 OFF/OP EST MAY X REQ PHY/QHP: CPT | Mod: PBBFAC,PN | Performed by: OBSTETRICS & GYNECOLOGY

## 2023-01-17 NOTE — PROGRESS NOTES
No c/o today   FHT normal   @ 27 3/7  1. IOB labs normal, CM enrolled, doing MT21, in complete, repeat set up with MFM; DM test and 3rd trim labs done; consents done 1/3     2. ITP: seeing MFM and heme  -monthly CBC until 32 weeks then Q2 weeks until 36 weeks then weekly   - understands there is a change she may not be able to get epidural, tried to see anesthesia but waited a long time and they didn't come so she left  -see MFM recommendations for medications/plt If needed     3. Depression/anxiety: stable     4. Anemia: getting iron infusions with heme     5. Bilateral choroid plexus cysts: declines MT21    -going to [facility]     Face to Face time with patient: 15 minutes of total time spent on the encounter, which includes face to face time and non-face to face time preparing to see the patient (eg, review of tests), Obtaining and/or reviewing separately obtained history, Documenting clinical information in the electronic or other health record, Independently interpreting results (not separately reported) and communicating results to the patient/family/caregiver, or Care coordination (not separately reported).

## 2023-01-21 ENCOUNTER — PATIENT MESSAGE (OUTPATIENT)
Dept: OTHER | Facility: OTHER | Age: 23
End: 2023-01-21
Payer: MEDICAID

## 2023-01-24 ENCOUNTER — LAB VISIT (OUTPATIENT)
Dept: LAB | Facility: HOSPITAL | Age: 23
End: 2023-01-24
Attending: INTERNAL MEDICINE
Payer: MEDICAID

## 2023-01-24 ENCOUNTER — OFFICE VISIT (OUTPATIENT)
Dept: HEMATOLOGY/ONCOLOGY | Facility: CLINIC | Age: 23
End: 2023-01-24
Payer: MEDICAID

## 2023-01-24 VITALS
SYSTOLIC BLOOD PRESSURE: 106 MMHG | WEIGHT: 151.25 LBS | BODY MASS INDEX: 21.7 KG/M2 | HEART RATE: 70 BPM | OXYGEN SATURATION: 99 % | DIASTOLIC BLOOD PRESSURE: 61 MMHG

## 2023-01-24 DIAGNOSIS — D69.3 IMMUNE THROMBOCYTOPENIA AFFECTING PREGNANCY IN SECOND TRIMESTER: ICD-10-CM

## 2023-01-24 DIAGNOSIS — D69.3 CHRONIC ITP (IDIOPATHIC THROMBOCYTOPENIA): ICD-10-CM

## 2023-01-24 DIAGNOSIS — R71.8 MICROCYTOSIS: ICD-10-CM

## 2023-01-24 DIAGNOSIS — D69.3 IMMUNE THROMBOCYTOPENIA AFFECTING PREGNANCY IN THIRD TRIMESTER: Primary | ICD-10-CM

## 2023-01-24 DIAGNOSIS — Z86.39 HISTORY OF IRON DEFICIENCY: ICD-10-CM

## 2023-01-24 DIAGNOSIS — Z3A.12 12 WEEKS GESTATION OF PREGNANCY: ICD-10-CM

## 2023-01-24 DIAGNOSIS — O99.113 IMMUNE THROMBOCYTOPENIA AFFECTING PREGNANCY IN THIRD TRIMESTER: Primary | ICD-10-CM

## 2023-01-24 DIAGNOSIS — O99.112 IMMUNE THROMBOCYTOPENIA AFFECTING PREGNANCY IN SECOND TRIMESTER: ICD-10-CM

## 2023-01-24 DIAGNOSIS — E61.1 IRON DEFICIENCY: ICD-10-CM

## 2023-01-24 LAB
BASOPHILS # BLD AUTO: 0.04 K/UL (ref 0–0.2)
BASOPHILS NFR BLD: 0.5 % (ref 0–1.9)
DIFFERENTIAL METHOD: ABNORMAL
EOSINOPHIL # BLD AUTO: 0.1 K/UL (ref 0–0.5)
EOSINOPHIL NFR BLD: 0.9 % (ref 0–8)
ERYTHROCYTE [DISTWIDTH] IN BLOOD BY AUTOMATED COUNT: 13.5 % (ref 11.5–14.5)
FERRITIN SERPL-MCNC: 34 NG/ML (ref 20–300)
HCT VFR BLD AUTO: 37 % (ref 37–48.5)
HGB BLD-MCNC: 12.8 G/DL (ref 12–16)
IMM GRANULOCYTES # BLD AUTO: 0.12 K/UL (ref 0–0.04)
IMM GRANULOCYTES NFR BLD AUTO: 1.4 % (ref 0–0.5)
IRON SERPL-MCNC: 97 UG/DL (ref 30–160)
LYMPHOCYTES # BLD AUTO: 1.6 K/UL (ref 1–4.8)
LYMPHOCYTES NFR BLD: 18.8 % (ref 18–48)
MCH RBC QN AUTO: 30.1 PG (ref 27–31)
MCHC RBC AUTO-ENTMCNC: 34.6 G/DL (ref 32–36)
MCV RBC AUTO: 87 FL (ref 82–98)
MONOCYTES # BLD AUTO: 0.5 K/UL (ref 0.3–1)
MONOCYTES NFR BLD: 6.1 % (ref 4–15)
NEUTROPHILS # BLD AUTO: 6.2 K/UL (ref 1.8–7.7)
NEUTROPHILS NFR BLD: 72.3 % (ref 38–73)
NRBC BLD-RTO: 0 /100 WBC
PLATELET # BLD AUTO: 63 K/UL (ref 150–450)
PMV BLD AUTO: 12.6 FL (ref 9.2–12.9)
RBC # BLD AUTO: 4.25 M/UL (ref 4–5.4)
SATURATED IRON: 17 % (ref 20–50)
TOTAL IRON BINDING CAPACITY: 571 UG/DL (ref 250–450)
TRANSFERRIN SERPL-MCNC: 386 MG/DL (ref 200–375)
WBC # BLD AUTO: 8.58 K/UL (ref 3.9–12.7)

## 2023-01-24 PROCEDURE — 85025 COMPLETE CBC W/AUTO DIFF WBC: CPT | Performed by: INTERNAL MEDICINE

## 2023-01-24 PROCEDURE — 3008F BODY MASS INDEX DOCD: CPT | Mod: CPTII,,, | Performed by: INTERNAL MEDICINE

## 2023-01-24 PROCEDURE — 3078F PR MOST RECENT DIASTOLIC BLOOD PRESSURE < 80 MM HG: ICD-10-PCS | Mod: CPTII,,, | Performed by: INTERNAL MEDICINE

## 2023-01-24 PROCEDURE — 1159F PR MEDICATION LIST DOCUMENTED IN MEDICAL RECORD: ICD-10-PCS | Mod: CPTII,,, | Performed by: INTERNAL MEDICINE

## 2023-01-24 PROCEDURE — 82728 ASSAY OF FERRITIN: CPT | Performed by: INTERNAL MEDICINE

## 2023-01-24 PROCEDURE — 3008F PR BODY MASS INDEX (BMI) DOCUMENTED: ICD-10-PCS | Mod: CPTII,,, | Performed by: INTERNAL MEDICINE

## 2023-01-24 PROCEDURE — 36415 COLL VENOUS BLD VENIPUNCTURE: CPT | Performed by: INTERNAL MEDICINE

## 2023-01-24 PROCEDURE — 1159F MED LIST DOCD IN RCRD: CPT | Mod: CPTII,,, | Performed by: INTERNAL MEDICINE

## 2023-01-24 PROCEDURE — 99999 PR PBB SHADOW E&M-EST. PATIENT-LVL III: CPT | Mod: PBBFAC,,, | Performed by: INTERNAL MEDICINE

## 2023-01-24 PROCEDURE — 99213 OFFICE O/P EST LOW 20 MIN: CPT | Mod: PBBFAC,PO | Performed by: INTERNAL MEDICINE

## 2023-01-24 PROCEDURE — 3078F DIAST BP <80 MM HG: CPT | Mod: CPTII,,, | Performed by: INTERNAL MEDICINE

## 2023-01-24 PROCEDURE — 84466 ASSAY OF TRANSFERRIN: CPT | Performed by: INTERNAL MEDICINE

## 2023-01-24 PROCEDURE — 3074F PR MOST RECENT SYSTOLIC BLOOD PRESSURE < 130 MM HG: ICD-10-PCS | Mod: CPTII,,, | Performed by: INTERNAL MEDICINE

## 2023-01-24 PROCEDURE — 99999 PR PBB SHADOW E&M-EST. PATIENT-LVL III: ICD-10-PCS | Mod: PBBFAC,,, | Performed by: INTERNAL MEDICINE

## 2023-01-24 PROCEDURE — 3074F SYST BP LT 130 MM HG: CPT | Mod: CPTII,,, | Performed by: INTERNAL MEDICINE

## 2023-01-24 PROCEDURE — 99214 PR OFFICE/OUTPT VISIT, EST, LEVL IV, 30-39 MIN: ICD-10-PCS | Mod: S$PBB,,, | Performed by: INTERNAL MEDICINE

## 2023-01-24 PROCEDURE — 99214 OFFICE O/P EST MOD 30 MIN: CPT | Mod: S$PBB,,, | Performed by: INTERNAL MEDICINE

## 2023-01-24 NOTE — PROGRESS NOTES
"PATIENT: Tamica Ware  MRN: [id number]  DATE: 1/24/2023    Diagnosis:   1. 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 Microcytosis    3. Iron deficiency    4. Chronic ITP (idiopathic thrombocytopenia)    5. 12 weeks gestation of pregnancy      Chief Complaint: immune thrombocytopenia      Oncologic History:      Oncologic History     Oncologic Treatment     Pathology       Subjective:    History of Present Illness: Ms. Ware is a 22 y.o. female who presented in October 2022 for evaluation and management of immune thrombocytopenia during pregnancy. She was referred by Dr. Espinal.    - she has previously seen pediatric oncology for there thrombocytopenia. Information per Dr. Jordna's note dated 11/10/20:  " her bone marrow biopsy from 12/05 showed no evidence of leukemia or MDS.    - Her vWF antigen and activity and multimers were all normal.   - At 1st visit, FATUMA was negative, CMV was negative, EBV was positive for IgG but negative for IgM.    - I had suspected a viral/post-viral etiology or possibly autoimmune issue or possibly a primary platelet disorder. No schistocytes or significant anemia to suggest TTP.    - Platelet aggregation studies were not interpretable .   - HIV and Hep C antibody were negative.    - Testing for primary platelet disorder (glycoprotein testing, Type 2N vWF) were normal.   - today, she reports multiple symptoms including joint pain, photophobia and light-headedness.  She reports bruising and petechiae but on exam, had only a few small bruises on lower extremities and a few scattered petechiae  - had an extensive work-up in 2017 including bone marrow, platelet studies and vWD work-up which were negative. 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, chronic ITP  - her platelet count today was 86K with a mild anemia (Hb 11.4) and normal WBC count and differential  - review of platelet count over the last 2 years shows a range of 66 to " "101K  - never had significant bleeding after surgery (T and A in 2017), dental procedures or piercings   - we again discussed the possibility of an auto-immune disorder  - sent CRP and will follow-up  - she had been referred to rheumatology when I last saw her but did not schedule visit  - I am again  referring to rheumatology (Dr Del Rosario)  - if autoimmune work-up is negative, will consider repeating platelet aggregation studies."    Interval history:  - she presents for a follow-up appointment for her immune thrombocytopenia during pregnancy.  - she is currently 30 weeks pregnant.  - she received iron sucrose x 4 doses in November 2022. She noted an improvement in her fatigue.  - today, she endorses fatigue, nausea, constipation. She denies shortness of breath, chest pain, vomiting, diarrhea.        Past medical, surgical, family, and social histories have been reviewed and updated below.    Past Medical History:   Past Medical History:   Diagnosis Date    Suicidal ideations     Thrombocytopenia     Urinary tract infection        Past Surgical History:   Past Surgical History:   Procedure Laterality Date    TONSILLECTOMY         Family History:   Family History   Problem Relation Age of Onset    Breast cancer Neg Hx     Colon cancer Neg Hx     Ovarian cancer Neg Hx        Social History:  reports that she has quit smoking. Her smoking use included vaping with nicotine. She started smoking about 3 years ago. She has never used smokeless tobacco. She reports that she does not drink alcohol and does not use drugs.    Allergies:  Review of patient's allergies indicates:  No Known Allergies    Medications:  Current Outpatient Medications   Medication Sig Dispense Refill    ferrous sulfate (FEOSOL) 325 mg (65 mg iron) Tab tablet Take 1 tablet (325 mg total) by mouth once daily. 90 tablet 3    ondansetron (ZOFRAN-ODT) 4 MG TbDL Take 1 tablet (4 mg total) by mouth every 8 (eight) hours as needed. 14 tablet 1    PNV cmb " 52/iron/FA/omega-3/dha (COMPLETE YUNG DHA ORAL) Take by mouth.       No current facility-administered medications for this visit.       Review of Systems   Constitutional:  Positive for fatigue.   HENT:  Negative for sore throat.    Eyes:  Negative for visual disturbance.   Respiratory:  Negative for cough and shortness of breath.    Cardiovascular:  Negative for chest pain.   Gastrointestinal:  Positive for constipation and nausea. Negative for abdominal pain, diarrhea and vomiting.   Genitourinary:  Negative for dysuria.   Musculoskeletal:  Negative for back pain.   Skin:  Negative for rash.   Neurological:  Negative for headaches.   Hematological:  Negative for adenopathy.   Psychiatric/Behavioral:  The patient is not nervous/anxious.      ECOG Performance Status:   ECOG SCORE 1            Objective:      Vitals:   Vitals:    23 1001   BP: 106/61   Pulse: 70   SpO2: 99%   Weight: 68.6 kg (151 lb 3.8 oz)     BMI: Body mass index is 21.7 kg/m².    Physical Exam  Vitals and nursing note reviewed.   Constitutional:       Appearance: She is well-developed.   HENT:      Head: Normocephalic and atraumatic.   Eyes:      Pupils: Pupils are equal, round, and reactive to light.   Cardiovascular:      Rate and Rhythm: Normal rate and regular rhythm.   Pulmonary:      Effort: Pulmonary effort is normal.      Breath sounds: Normal breath sounds.   Abdominal:      General: Bowel sounds are normal.      Palpations: Abdomen is soft.   Musculoskeletal:         General: Normal range of motion.      Cervical back: Normal range of motion and neck supple.   Skin:     General: Skin is warm and dry.   Neurological:      Mental Status: She is alert and oriented to person, place, and time.   Psychiatric:         Behavior: Behavior normal.         Thought Content: Thought content normal.         Judgment: Judgment normal.       Laboratory Data:  Labs have been reviewed.    Lab Results   Component Value Date    WBC 8.84 2022     HGB 13.2 12/27/2022    HCT 38.7 12/27/2022    MCV 87 12/27/2022    PLT 68 (L) 12/27/2022           Imaging:    Assessment:       1. 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 Microcytosis    3. History of iron deficiency           Plan:     Thrombocytopenia during pregnancy  - I have reviewed her chart  - labs since 2016 reveal a moderate thrombocytopenia  - she had undergone extensive workup previously with pediatric oncology.  - I feel she has immune thrombocytopenia  - platelet count has ranged in the 60-70s k/uL.  - follow up labs from today. We will start checking platelet count every 2 weeks. Getting closer to delivery, we may consider a course of steroids to increase the platelet count above 80 k/uL, so that she could get an epidural if needed.  - return to clinic in 1.5 months    2. Iron deficiency  - iron studies (10/27/22) revealed severe iron deficiency  - she received iron sucrose x 4 doses in November 2022. She noted an improvement in her fatigue.  - follow up labs from today.    3. Advance Care Planning     Power of   After our discussion (at previous visit), the patient decided to complete a HCPOA and appointed her  mother-in-law Sunitha Moran ([phone]) and dad Bill Ware ([phone]) .     - return to clinic in 1.5 months    Curtis Cope M.D.  Hematology/Oncology  [facility] - [address], [address]  [place] [address]  Phone: [phone]  Fax: [phone]

## 2023-01-27 ENCOUNTER — PATIENT MESSAGE (OUTPATIENT)
Dept: HEMATOLOGY/ONCOLOGY | Facility: CLINIC | Age: 23
End: 2023-01-27
Payer: MEDICAID

## 2023-01-29 RX ORDER — HEPARIN 100 UNIT/ML
500 SYRINGE INTRAVENOUS
OUTPATIENT
Start: 2023-03-21

## 2023-01-29 RX ORDER — HEPARIN 100 UNIT/ML
500 SYRINGE INTRAVENOUS
Status: CANCELLED | OUTPATIENT
Start: 2023-02-01

## 2023-01-29 RX ORDER — SODIUM CHLORIDE 0.9 % (FLUSH) 0.9 %
10 SYRINGE (ML) INJECTION
OUTPATIENT
Start: 2023-03-28

## 2023-01-29 RX ORDER — HEPARIN 100 UNIT/ML
500 SYRINGE INTRAVENOUS
OUTPATIENT
Start: 2023-03-28

## 2023-01-29 RX ORDER — SODIUM CHLORIDE 0.9 % (FLUSH) 0.9 %
10 SYRINGE (ML) INJECTION
Status: CANCELLED | OUTPATIENT
Start: 2023-03-07

## 2023-01-29 RX ORDER — SODIUM CHLORIDE 0.9 % (FLUSH) 0.9 %
10 SYRINGE (ML) INJECTION
Status: CANCELLED | OUTPATIENT
Start: 2023-02-01

## 2023-01-29 RX ORDER — SODIUM CHLORIDE 0.9 % (FLUSH) 0.9 %
10 SYRINGE (ML) INJECTION
OUTPATIENT
Start: 2023-03-21

## 2023-01-29 RX ORDER — HEPARIN 100 UNIT/ML
500 SYRINGE INTRAVENOUS
Status: CANCELLED | OUTPATIENT
Start: 2023-03-07

## 2023-02-02 ENCOUNTER — TELEPHONE (OUTPATIENT)
Dept: INFUSION THERAPY | Facility: HOSPITAL | Age: 23
End: 2023-02-02
Payer: MEDICAID

## 2023-02-02 ENCOUNTER — PROCEDURE VISIT (OUTPATIENT)
Dept: MATERNAL FETAL MEDICINE | Facility: CLINIC | Age: 23
End: 2023-02-02
Payer: MEDICAID

## 2023-02-02 ENCOUNTER — OFFICE VISIT (OUTPATIENT)
Dept: MATERNAL FETAL MEDICINE | Facility: CLINIC | Age: 23
End: 2023-02-02
Payer: MEDICAID

## 2023-02-02 VITALS
WEIGHT: 153 LBS | DIASTOLIC BLOOD PRESSURE: 80 MMHG | BODY MASS INDEX: 21.9 KG/M2 | SYSTOLIC BLOOD PRESSURE: 136 MMHG | HEIGHT: 70 IN

## 2023-02-02 DIAGNOSIS — Z36.89 ENCOUNTER FOR ULTRASOUND TO CHECK FETAL GROWTH: Primary | ICD-10-CM

## 2023-02-02 DIAGNOSIS — Z3A.31 31 WEEKS GESTATION OF PREGNANCY: ICD-10-CM

## 2023-02-02 DIAGNOSIS — D69.3 IMMUNE THROMBOCYTOPENIA AFFECTING PREGNANCY IN THIRD TRIMESTER: ICD-10-CM

## 2023-02-02 DIAGNOSIS — O99.113 IMMUNE THROMBOCYTOPENIA AFFECTING PREGNANCY IN THIRD TRIMESTER: ICD-10-CM

## 2023-02-02 DIAGNOSIS — D69.3 CHRONIC ITP (IDIOPATHIC THROMBOCYTOPENIA): ICD-10-CM

## 2023-02-02 DIAGNOSIS — Z36.89 ENCOUNTER FOR ULTRASOUND TO CHECK FETAL GROWTH: ICD-10-CM

## 2023-02-02 PROCEDURE — 76816 OB US FOLLOW-UP PER FETUS: CPT | Mod: 26,S$PBB,, | Performed by: OBSTETRICS & GYNECOLOGY

## 2023-02-02 PROCEDURE — 76816 OB US FOLLOW-UP PER FETUS: CPT | Mod: PBBFAC,PO | Performed by: OBSTETRICS & GYNECOLOGY

## 2023-02-02 PROCEDURE — 99999 PR PBB SHADOW E&M-EST. PATIENT-LVL II: CPT | Mod: PBBFAC,,, | Performed by: OBSTETRICS & GYNECOLOGY

## 2023-02-02 PROCEDURE — 76816 PR  US,PREGNANT UTERUS,F/U,TRANSABD APP: ICD-10-PCS | Mod: 26,S$PBB,, | Performed by: OBSTETRICS & GYNECOLOGY

## 2023-02-02 PROCEDURE — 3008F BODY MASS INDEX DOCD: CPT | Mod: CPTII,,, | Performed by: OBSTETRICS & GYNECOLOGY

## 2023-02-02 PROCEDURE — 1159F MED LIST DOCD IN RCRD: CPT | Mod: CPTII,,, | Performed by: OBSTETRICS & GYNECOLOGY

## 2023-02-02 PROCEDURE — 99215 PR OFFICE/OUTPT VISIT, EST, LEVL V, 40-54 MIN: ICD-10-PCS | Mod: 25,S$PBB,TH, | Performed by: OBSTETRICS & GYNECOLOGY

## 2023-02-02 PROCEDURE — 1159F PR MEDICATION LIST DOCUMENTED IN MEDICAL RECORD: ICD-10-PCS | Mod: CPTII,,, | Performed by: OBSTETRICS & GYNECOLOGY

## 2023-02-02 PROCEDURE — 99999 PR PBB SHADOW E&M-EST. PATIENT-LVL II: ICD-10-PCS | Mod: PBBFAC,,, | Performed by: OBSTETRICS & GYNECOLOGY

## 2023-02-02 PROCEDURE — 99215 OFFICE O/P EST HI 40 MIN: CPT | Mod: 25,S$PBB,TH, | Performed by: OBSTETRICS & GYNECOLOGY

## 2023-02-02 PROCEDURE — 99212 OFFICE O/P EST SF 10 MIN: CPT | Mod: PBBFAC,TH,PO | Performed by: OBSTETRICS & GYNECOLOGY

## 2023-02-02 PROCEDURE — 3079F DIAST BP 80-89 MM HG: CPT | Mod: CPTII,,, | Performed by: OBSTETRICS & GYNECOLOGY

## 2023-02-02 PROCEDURE — 3075F PR MOST RECENT SYSTOLIC BLOOD PRESS GE 130-139MM HG: ICD-10-PCS | Mod: CPTII,,, | Performed by: OBSTETRICS & GYNECOLOGY

## 2023-02-02 PROCEDURE — 3079F PR MOST RECENT DIASTOLIC BLOOD PRESSURE 80-89 MM HG: ICD-10-PCS | Mod: CPTII,,, | Performed by: OBSTETRICS & GYNECOLOGY

## 2023-02-02 PROCEDURE — 3008F PR BODY MASS INDEX (BMI) DOCUMENTED: ICD-10-PCS | Mod: CPTII,,, | Performed by: OBSTETRICS & GYNECOLOGY

## 2023-02-02 PROCEDURE — 3075F SYST BP GE 130 - 139MM HG: CPT | Mod: CPTII,,, | Performed by: OBSTETRICS & GYNECOLOGY

## 2023-02-02 NOTE — PROGRESS NOTES
Outpatient OB Anesthesia Consult      Date and Time: 2023 3:17 PM     Request from: Dr. Marlene SIDDIQUI requesting physician or midwife:    Reason for Consult: Anesthetic recommendations for delivery    Initial Consult?: Yes    Chief Complaint: ITP at 30 weeks EGA    HPI: Patient is a 22 y.o. year old woman, G 1 P 0 presenting with ITP. Her platelet count has ranged 60-80Ks on recent labs. She has not received steroids yet this pregnancy. She had a tonsillectomy without bleeding issues. No prior transfusions. She desires NCB for this delivery.    Past medical history:    Past Medical History:   Diagnosis Date    Suicidal ideations     Thrombocytopenia     Urinary tract infection        Past surgical history:    Past Surgical History:   Procedure Laterality Date    TONSILLECTOMY         Family history:    Family History   Problem Relation Age of Onset    Breast cancer Neg Hx     Colon cancer Neg Hx     Ovarian cancer Neg Hx        Social History:    Social History     Socioeconomic History    Marital status: Single   Tobacco Use    Smoking status: Former     Types: Vaping with nicotine     Start date: 2019    Smokeless tobacco: Never   Substance and Sexual Activity    Alcohol use: Never    Drug use: Never    Sexual activity: Yes     Partners: Male   Social History Narrative    Pt lives at home with both parents, sister Lisa and brother Farideh        Not in school at the moment, trying to get into college    1 dog       Medication:    Current Outpatient Medications on File Prior to Visit   Medication Sig Dispense Refill    ferrous sulfate (FEOSOL) 325 mg (65 mg iron) Tab tablet Take 1 tablet (325 mg total) by mouth once daily. 90 tablet 3    ondansetron (ZOFRAN-ODT) 4 MG TbDL Take 1 tablet (4 mg total) by mouth every 8 (eight) hours as needed. 14 tablet 1    PNV cmb 52/iron/FA/omega-3/dha (COMPLETE YUNG DHA ORAL) Take by mouth.       No current facility-administered medications on file prior to visit.        Allergies:    Patient has no known allergies.    Family or personal history of anesthetic complications: No    Diagnostic Studies: I have reviewed the following relevant findings as noted below:  CBC:  Lab Results   Component Value Date    WBC 8.58 01/24/2023    HGB 12.8 01/24/2023    HCT 37.0 01/24/2023    MCV 87 01/24/2023    PLT 63 (L) 01/24/2023      CMP:  Sodium   Date Value Ref Range Status   07/24/2021 139 136 - 145 mmol/L Final     Potassium   Date Value Ref Range Status   07/24/2021 3.9 3.5 - 5.1 mmol/L Final     Chloride   Date Value Ref Range Status   07/24/2021 103 95 - 110 mmol/L Final     CO2   Date Value Ref Range Status   07/24/2021 27 23 - 29 mmol/L Final     Glucose   Date Value Ref Range Status   07/24/2021 95 70 - 110 mg/dL Final     BUN   Date Value Ref Range Status   07/24/2021 6 (L) 7 - 17 mg/dL Final     Creatinine   Date Value Ref Range Status   07/24/2021 0.47 (L) 0.50 - 1.40 mg/dL Final     Calcium   Date Value Ref Range Status   07/24/2021 9.4 8.7 - 10.5 mg/dL Final     Total Protein   Date Value Ref Range Status   07/24/2021 8.0 6.0 - 8.4 g/dL Final     Albumin   Date Value Ref Range Status   07/24/2021 4.7 3.2 - 4.7 g/dL Final     Total Bilirubin   Date Value Ref Range Status   07/24/2021 0.4 0.1 - 1.0 mg/dL Final     Comment:     For infants and newborns, interpretation of results should be based  on gestational age, weight and in agreement with clinical  observations.    Premature Infant recommended reference ranges:  Up to 24 hours.............<8.0 mg/dL  Up to 48 hours............<12.0 mg/dL  3-5 days..................<15.0 mg/dL  6-29 days.................<15.0 mg/dL       Alkaline Phosphatase   Date Value Ref Range Status   07/24/2021 80 50 - 130 U/L Final     AST   Date Value Ref Range Status   07/24/2021 28 15 - 46 U/L Final     ALT   Date Value Ref Range Status   07/24/2021 17 10 - 44 U/L Final     Anion Gap   Date Value Ref Range Status   07/24/2021 9 8 - 16 mmol/L Final      eGFR if    Date Value Ref Range Status   07/24/2021 >60.0 >60 mL/min/1.73 m^2 Final     eGFR if non    Date Value Ref Range Status   07/24/2021 >60.0 >60 mL/min/1.73 m^2 Final     Comment:     Calculation used to obtain the estimated glomerular filtration  rate (eGFR) is the CKD-EPI equation.        BMP:   Lab Results   Component Value Date     07/24/2021    K 3.9 07/24/2021     07/24/2021    CO2 27 07/24/2021    BUN 6 (L) 07/24/2021    CREATININE 0.47 (L) 07/24/2021    CALCIUM 9.4 07/24/2021    ANIONGAP 9 07/24/2021    ESTGFRAFRICA >60.0 07/24/2021    EGFRNONAA >60.0 07/24/2021     Coagulation studies:   Lab Results   Component Value Date    INR 1.0 11/28/2016    APTT 26.0 11/28/2016       Review of Systems:   Constitutional: Negative for fever and chills; well appearing female  Eyes: no visual changes  Ear, nose, mouth and throat: no loose or broken teeth  Cardiovascular: no chest pain  Respiratory: no shortness of breath  Gastrointestinal: no nausea or vomiting  Genitourinary: no dysuria  Musculoskeletal: no joint pain  Skin: warm and dry, no rashes  Neurologic: no seizures  Psychiatric: no anxiety or depression  Endocrinology: no heat or cold intolerance  Hematologic: does not bruise or bleed easily      Physical Exam:  Vitals: see clinic notes  Constitutional: alert, no distress  Eyes: normal lids, pupils symmetric  Ear, nose, mouth and throat: Mallampati I, normal thyromental distance, normal lips, teeth, gums and tongue   Cardiovascular: normal rate, no murmurs  Respiratory: normal effort, no wheezes  Musculoskeletal: normal gait, normal range of motion  Skin: no rashes, no induration  Neurologic: normal reflexes and sensation  Psychiatric: oriented to person, place, time; normal affect    ASA Score: 3    Diagnosis: ITP    Assessment and Plan:  We discussed that depending on her platelent count at the time of delivery the anesthetic options for her may differ.  If she desires labor analgesia we discussed that a stable PLT count >/= 70K would be acceptable for epidural placement, if her PLT count at the time of delivery were below this she would require NCB & this is her current plan for delivery regardless of PLT count. If she requires  delivery a PLT count >/= 50K would be acceptable for single shot spinal, if below this or if an emergency occurs she would require GETA. Patient expressed understanding & all questions were answered.    Complexity: Moderate    Entertained and answered all questions to the patient's satisfaction.      Naomi Hinds MD

## 2023-02-02 NOTE — PROGRESS NOTES
"MATERNAL-FETAL MEDICINE   FOLLOW UP NOTE      SUBJECTIVE:     Ms. Tamica Ware is a 22 y.o.  female with IUP at 31w5d who is seen in consultation by [facility] for evaluation and management of:  Problem   Chronic Itp (Idiopathic Thrombocytopenia)     Patient accompanied by half sister (Marina)    Previous notes reviewed.     Interval history since last [facility] visit: Patient reports no problems     Medications:  Current Outpatient Medications   Medication Instructions    ferrous sulfate (FEOSOL) 325 mg, Oral, Daily    ondansetron (ZOFRAN-ODT) 4 mg, Oral, Every 8 hours PRN    PNV cmb 52/iron/FA/omega-3/dha (COMPLETE  DHA ORAL) Oral         Objective:   /80   Ht 5' 10" (1.778 m)   Wt 69.4 kg (153 lb)   LMP 2022   BMI 21.95 kg/m²     Ultrasound performed. See viewpoint for full ultrasound report.  1. 31w 5d cerda intrauterine pregnancy  2. Interval fetal growth wnl (EFW = 1825 gms at 29% and AC 31%)  3. No fetal structural abnormalities appreciated for organs evaluated today   4. Amniotic fluid volume wnl per MVP 3.3 cm  5. BPP without NST = 8/8  6. Breech presentation     Patient counseled on today's ultrasound and recommendations.     ASSESSMENT/PLAN:     22 y.o.  female with IUP at 31w5d gestation     /80 mmhg     Wt 69.4 kg     Chronic ITP (idiopathic thrombocytopenia)  Patient without complaints today    Labs:   23:  Platelets 63k   Hb/Hct 12.8/37.0    Patient reports never has she taken steroids or IVIG.   Patient being managed by Hematology/Oncology Dr. Cope     Patient counseled on recommendations for f/u ultrasound and visit with M on 23. Patient counseled on recommendation to go to Labor and Delivery unit at this time to have a consultation with Dr. Naomi Hinds (Ob Anesthesia). I counseled patient on pursuit of glucose screen which she reports she can do tomorrow. Patient to be followed up with Hem/Onc Dr. Cope. Patient's questions were answered. "     Recommendations/plan:  -ultrasound and RT MFM MD visit on 23: interval fetal growth, amniotic fluid volume and maternal/fetal follow up     -Recommend OB anesthesia consult: I spoke to Dr. Naomi Hinds and she will see patient today for consultation       (patient previously had OB anesthesia consult scheduled but had to leave after several hours waiting per patient report)    -Recommend glucose screen: ordered previously but patient has not pursued. Patient reports will pursue tomorrow     -ITP with latest platelets 63k on 23 managed by Hematology/Oncology Dr. Cope    see below general [facility] recommendations for patients with ITP:   Recommendations:   Check CBC every 4-6 weeks; increase frequency of evaluation at 32 weeks to every 2 weeks, and weekly at 36 weeks and beyond   Continue care with Hematology/Oncology   If platelet count is > 50 K/µLand patient is asymptomatic, no treatment is indicated   Treatment is indicated:   o For patients with a platelet count is < 30 K/µL    o For patients who have symptomatic bleeding   o For patients with a platelet count < 50 K/µL who are anticipated to undergo a  delivery   o For patients with platelet count < 70 - 80 K/µL around the time of delivery to increase the likelihood of being able to safely receive neuraxial anesthesia    Anesthesia consultation is recommended around 30-32 weeks gestation: to be performed today by Dr. Naomi Hinds    Platelet transfusion should be administered:   o For patients with a platelet count < 30 K/µL and undergoing vaginal delivery   o For patients experiencing significant bleeding   o For patients with a platelet count < 50 K/µL and undergoing  delivery   Treatment options:   o First line: prednisone 0.5 - 2 mg/kg/day (response is usually seen within 4-14 days)/--alternative is dexamethasone burst (40 mg POD daily x 4 days)     Stress dosed steroids are recommended at time of delivery in patients  receiving prednisone = 10 mg daily (or steroid equivalent) for = 21 days    o IVIG can be considered for refractory cases or when rapid increase is necessary in consultation with Hematology/Oncology   o Splenectomy can be considered, if deemed appropriate by Hematology/Oncology (this is best accomplished in second trimester)             Ensure appropriate vaccinations (for pneumococcus, HIB, and meningococcus)   Avoid NSAIDs and aspirin (if patient is candidate for low dose aspirin therapy for preeclampsia risk reduction, discuss with Hematology/Oncology)   If candidate for anticoagulation (ie prophylactic lovenox), discuss with Hematology/Oncology   Check CBC on admission to L&D; type and crossmatch for prbc and transfuse platelets, if appropriate, as above   Mode of delivery per obstetric indications; however, would avoid use of fetal scalp electrode and avoid operative vaginal delivery (particularly vacuum assisted)   Notify pediatrics of maternal condition after birth as  will need platelet count checked and circumcision of male infant may need to be deferred         About 40 minutes of total time spent on the encounter, which includes face to face time and non-face to face time preparing to see the patient (eg, review of tests), obtaining and/or reviewing separately obtained history, documenting clinical information in the electronic or other health record, independently interpreting results (not separately reported) and communicating results to the patient/family/caregiver, or care coordination (not separately reported).      Kylee Rosario  Maternal-Fetal Medicine    Electronically Signed by Kylee Rosario 2023

## 2023-02-02 NOTE — TELEPHONE ENCOUNTER
Confirmed infusion appointments with the patient  2/27 at 1130a  3/6 at 1130a  3/13 at 1130a  3/20 at 1130a

## 2023-02-02 NOTE — ASSESSMENT & PLAN NOTE
Patient without complaints today    Labs:   23:  Platelets 63k   Hb/Hct 12.8/37.0    Patient reports never has she taken steroids or IVIG.   Patient being managed by Hematology/Oncology Dr. Cope     Recommendations/plan:  -ultrasound and RT MFM MD visit on 23: interval fetal growth, amniotic fluid volume and maternal/fetal follow up     -Recommend OB anesthesia consult: I spoke to Dr. Naomi Hinds and she will see patient today for consultation       (patient previously had OB anesthesia consult scheduled but had to leave after several hours waiting per patient report)    -Recommend glucose screen: ordered previously but patient has not pursued. Patient reports will pursue tomorrow     -ITP with latest platelets 63k on 23 managed by Hematology/Oncology Dr. Cope    see below general [facility] recommendations for patients with ITP:   Recommendations:   Check CBC every 4-6 weeks; increase frequency of evaluation at 32 weeks to every 2 weeks, and weekly at 36 weeks and beyond   Continue care with Hematology/Oncology   If platelet count is > 50 K/µLand patient is asymptomatic, no treatment is indicated   Treatment is indicated:   o For patients with a platelet count is < 30 K/µL    o For patients who have symptomatic bleeding   o For patients with a platelet count < 50 K/µL who are anticipated to undergo a  delivery   o For patients with platelet count < 70 - 80 K/µL around the time of delivery to increase the likelihood of being able to safely receive neuraxial anesthesia    Anesthesia consultation is recommended around 30-32 weeks gestation: to be performed today by Dr. Naomi Hinds    Platelet transfusion should be administered:   o For patients with a platelet count < 30 K/µL and undergoing vaginal delivery   o For patients experiencing significant bleeding   o For patients with a platelet count < 50 K/µL and undergoing  delivery   Treatment options:   o First line:  prednisone 0.5 - 2 mg/kg/day (response is usually seen within 4-14 days)/--alternative is dexamethasone burst (40 mg POD daily x 4 days)     Stress dosed steroids are recommended at time of delivery in patients receiving prednisone = 10 mg daily (or steroid equivalent) for = 21 days    o IVIG can be considered for refractory cases or when rapid increase is necessary in consultation with Hematology/Oncology   o Splenectomy can be considered, if deemed appropriate by Hematology/Oncology (this is best accomplished in second trimester)             Ensure appropriate vaccinations (for pneumococcus, HIB, and meningococcus)   Avoid NSAIDs and aspirin (if patient is candidate for low dose aspirin therapy for preeclampsia risk reduction, discuss with Hematology/Oncology)   If candidate for anticoagulation (ie prophylactic lovenox), discuss with Hematology/Oncology   Check CBC on admission to L&D; type and crossmatch for prbc and transfuse platelets, if appropriate, as above   Mode of delivery per obstetric indications; however, would avoid use of fetal scalp electrode and avoid operative vaginal delivery (particularly vacuum assisted)   Notify pediatrics of maternal condition after birth as  will need platelet count checked and circumcision of male infant may need to be deferred

## 2023-02-03 ENCOUNTER — LAB VISIT (OUTPATIENT)
Dept: LAB | Facility: HOSPITAL | Age: 23
End: 2023-02-03
Attending: OBSTETRICS & GYNECOLOGY
Payer: MEDICAID

## 2023-02-03 DIAGNOSIS — Z3A.27 27 WEEKS GESTATION OF PREGNANCY: ICD-10-CM

## 2023-02-03 LAB — GLUCOSE SERPL-MCNC: 95 MG/DL (ref 70–140)

## 2023-02-03 PROCEDURE — 82950 GLUCOSE TEST: CPT | Performed by: OBSTETRICS & GYNECOLOGY

## 2023-02-03 PROCEDURE — 36415 COLL VENOUS BLD VENIPUNCTURE: CPT | Performed by: OBSTETRICS & GYNECOLOGY

## 2023-02-04 ENCOUNTER — PATIENT MESSAGE (OUTPATIENT)
Dept: OTHER | Facility: OTHER | Age: 23
End: 2023-02-04
Payer: MEDICAID

## 2023-02-06 ENCOUNTER — PATIENT MESSAGE (OUTPATIENT)
Dept: OBSTETRICS AND GYNECOLOGY | Facility: HOSPITAL | Age: 23
End: 2023-02-06
Payer: MEDICAID

## 2023-02-25 ENCOUNTER — PATIENT MESSAGE (OUTPATIENT)
Dept: OTHER | Facility: OTHER | Age: 23
End: 2023-02-25
Payer: MEDICAID

## 2023-02-27 ENCOUNTER — TELEPHONE (OUTPATIENT)
Dept: INFUSION THERAPY | Facility: HOSPITAL | Age: 23
End: 2023-02-27

## 2023-02-27 NOTE — TELEPHONE ENCOUNTER
Called pt concerning missed appointment for venofer iron infusion at 11:30 today. No answer. Left a message for pt to please return call.

## 2023-02-28 ENCOUNTER — ROUTINE PRENATAL (OUTPATIENT)
Dept: OBSTETRICS AND GYNECOLOGY | Facility: CLINIC | Age: 23
End: 2023-02-28
Payer: MEDICAID

## 2023-02-28 ENCOUNTER — PATIENT MESSAGE (OUTPATIENT)
Dept: OBSTETRICS AND GYNECOLOGY | Facility: CLINIC | Age: 23
End: 2023-02-28

## 2023-02-28 ENCOUNTER — HOSPITAL ENCOUNTER (OUTPATIENT)
Facility: HOSPITAL | Age: 23
Discharge: HOME | End: 2023-02-28
Attending: OBSTETRICS & GYNECOLOGY | Admitting: OBSTETRICS & GYNECOLOGY
Payer: MEDICAID

## 2023-02-28 VITALS — DIASTOLIC BLOOD PRESSURE: 98 MMHG | WEIGHT: 156.5 LBS | SYSTOLIC BLOOD PRESSURE: 134 MMHG | BODY MASS INDEX: 22.46 KG/M2

## 2023-02-28 VITALS
RESPIRATION RATE: 16 BRPM | HEART RATE: 63 BPM | WEIGHT: 156.5 LBS | HEIGHT: 70 IN | BODY MASS INDEX: 22.41 KG/M2 | DIASTOLIC BLOOD PRESSURE: 89 MMHG | SYSTOLIC BLOOD PRESSURE: 132 MMHG | OXYGEN SATURATION: 100 % | TEMPERATURE: 98 F

## 2023-02-28 DIAGNOSIS — O16.9 ELEVATED BLOOD PRESSURE AFFECTING PREGNANCY, ANTEPARTUM: ICD-10-CM

## 2023-02-28 DIAGNOSIS — Z3A.35 35 WEEKS GESTATION OF PREGNANCY: Primary | ICD-10-CM

## 2023-02-28 DIAGNOSIS — Z71.89 ENCOUNTER FOR BREAST FEEDING COUNSELING: ICD-10-CM

## 2023-02-28 DIAGNOSIS — D69.3 CHRONIC ITP (IDIOPATHIC THROMBOCYTOPENIA): ICD-10-CM

## 2023-02-28 DIAGNOSIS — Z3A.35 35 WEEKS GESTATION OF PREGNANCY: ICD-10-CM

## 2023-02-28 LAB
ABO + RH BLD: NORMAL
ALBUMIN SERPL BCP-MCNC: 3.1 G/DL (ref 3.5–5.2)
ALP SERPL-CCNC: 219 U/L (ref 55–135)
ALT SERPL W/O P-5'-P-CCNC: 12 U/L (ref 10–44)
ANION GAP SERPL CALC-SCNC: 6 MMOL/L (ref 8–16)
AST SERPL-CCNC: 18 U/L (ref 10–40)
BASOPHILS # BLD AUTO: 0.02 K/UL (ref 0–0.2)
BASOPHILS NFR BLD: 0.2 % (ref 0–1.9)
BILIRUB SERPL-MCNC: 0.4 MG/DL (ref 0.1–1)
BLD GP AB SCN CELLS X3 SERPL QL: NORMAL
BUN SERPL-MCNC: 5 MG/DL (ref 6–20)
CALCIUM SERPL-MCNC: 8.9 MG/DL (ref 8.7–10.5)
CHLORIDE SERPL-SCNC: 104 MMOL/L (ref 95–110)
CO2 SERPL-SCNC: 26 MMOL/L (ref 23–29)
CREAT SERPL-MCNC: 0.6 MG/DL (ref 0.5–1.4)
CREAT UR-MCNC: 14.6 MG/DL (ref 15–325)
DIFFERENTIAL METHOD: ABNORMAL
EOSINOPHIL # BLD AUTO: 0 K/UL (ref 0–0.5)
EOSINOPHIL NFR BLD: 0.1 % (ref 0–8)
ERYTHROCYTE [DISTWIDTH] IN BLOOD BY AUTOMATED COUNT: 12.7 % (ref 11.5–14.5)
EST. GFR  (NO RACE VARIABLE): >60 ML/MIN/1.73 M^2
GLUCOSE SERPL-MCNC: 84 MG/DL (ref 70–110)
HCT VFR BLD AUTO: 36.3 % (ref 37–48.5)
HGB BLD-MCNC: 13 G/DL (ref 12–16)
HIV 1+2 AB+HIV1 P24 AG SERPL QL IA: NORMAL
IMM GRANULOCYTES # BLD AUTO: 0.09 K/UL (ref 0–0.04)
IMM GRANULOCYTES NFR BLD AUTO: 1 % (ref 0–0.5)
LYMPHOCYTES # BLD AUTO: 1.6 K/UL (ref 1–4.8)
LYMPHOCYTES NFR BLD: 17.2 % (ref 18–48)
MCH RBC QN AUTO: 30.2 PG (ref 27–31)
MCHC RBC AUTO-ENTMCNC: 35.8 G/DL (ref 32–36)
MCV RBC AUTO: 84 FL (ref 82–98)
MONOCYTES # BLD AUTO: 0.4 K/UL (ref 0.3–1)
MONOCYTES NFR BLD: 4.3 % (ref 4–15)
NEUTROPHILS # BLD AUTO: 7.2 K/UL (ref 1.8–7.7)
NEUTROPHILS NFR BLD: 77.2 % (ref 38–73)
NRBC BLD-RTO: 0 /100 WBC
PLATELET # BLD AUTO: 70 K/UL (ref 150–450)
PMV BLD AUTO: 13.1 FL (ref 9.2–12.9)
POTASSIUM SERPL-SCNC: 4 MMOL/L (ref 3.5–5.1)
PROT SERPL-MCNC: 6.5 G/DL (ref 6–8.4)
PROT UR-MCNC: <7 MG/DL (ref 0–15)
PROT/CREAT UR: ABNORMAL MG/G{CREAT} (ref 0–0.2)
RBC # BLD AUTO: 4.31 M/UL (ref 4–5.4)
SODIUM SERPL-SCNC: 136 MMOL/L (ref 136–145)
WBC # BLD AUTO: 9.3 K/UL (ref 3.9–12.7)

## 2023-02-28 PROCEDURE — 82570 ASSAY OF URINE CREATININE: CPT | Performed by: OBSTETRICS & GYNECOLOGY

## 2023-02-28 PROCEDURE — 87389 HIV-1 AG W/HIV-1&-2 AB AG IA: CPT | Performed by: OBSTETRICS & GYNECOLOGY

## 2023-02-28 PROCEDURE — 87081 CULTURE SCREEN ONLY: CPT | Performed by: OBSTETRICS & GYNECOLOGY

## 2023-02-28 PROCEDURE — 99213 PR OFFICE/OUTPT VISIT, EST, LEVL III, 20-29 MIN: ICD-10-PCS | Mod: TH,S$PBB,, | Performed by: OBSTETRICS & GYNECOLOGY

## 2023-02-28 PROCEDURE — 36415 COLL VENOUS BLD VENIPUNCTURE: CPT | Performed by: OBSTETRICS & GYNECOLOGY

## 2023-02-28 PROCEDURE — 86592 SYPHILIS TEST NON-TREP QUAL: CPT | Performed by: OBSTETRICS & GYNECOLOGY

## 2023-02-28 PROCEDURE — 99213 OFFICE O/P EST LOW 20 MIN: CPT | Mod: TH,S$PBB,, | Performed by: OBSTETRICS & GYNECOLOGY

## 2023-02-28 PROCEDURE — 85025 COMPLETE CBC W/AUTO DIFF WBC: CPT | Performed by: OBSTETRICS & GYNECOLOGY

## 2023-02-28 PROCEDURE — 80053 COMPREHEN METABOLIC PANEL: CPT | Performed by: OBSTETRICS & GYNECOLOGY

## 2023-02-28 PROCEDURE — 99212 OFFICE O/P EST SF 10 MIN: CPT | Mod: PBBFAC,TH,PO | Performed by: OBSTETRICS & GYNECOLOGY

## 2023-02-28 PROCEDURE — 99999 PR PBB SHADOW E&M-EST. PATIENT-LVL II: CPT | Mod: PBBFAC,,, | Performed by: OBSTETRICS & GYNECOLOGY

## 2023-02-28 PROCEDURE — 86900 BLOOD TYPING SEROLOGIC ABO: CPT | Performed by: OBSTETRICS & GYNECOLOGY

## 2023-02-28 PROCEDURE — 99999 PR PBB SHADOW E&M-EST. PATIENT-LVL II: ICD-10-PCS | Mod: PBBFAC,,, | Performed by: OBSTETRICS & GYNECOLOGY

## 2023-02-28 RX ORDER — ONDANSETRON 8 MG/1
8 TABLET, ORALLY DISINTEGRATING ORAL EVERY 8 HOURS PRN
Status: CANCELLED | OUTPATIENT
Start: 2023-02-28

## 2023-02-28 RX ORDER — ACETAMINOPHEN 500 MG
500 TABLET ORAL EVERY 6 HOURS PRN
Status: CANCELLED | OUTPATIENT
Start: 2023-02-28

## 2023-02-28 RX ORDER — PROCHLORPERAZINE EDISYLATE 5 MG/ML
5 INJECTION INTRAMUSCULAR; INTRAVENOUS EVERY 6 HOURS PRN
Status: DISCONTINUED | OUTPATIENT
Start: 2023-02-28 | End: 2023-02-28 | Stop reason: HOSPADM

## 2023-02-28 RX ORDER — ACETAMINOPHEN 500 MG
500 TABLET ORAL EVERY 6 HOURS PRN
Status: DISCONTINUED | OUTPATIENT
Start: 2023-02-28 | End: 2023-02-28 | Stop reason: HOSPADM

## 2023-02-28 RX ORDER — ONDANSETRON 8 MG/1
8 TABLET, ORALLY DISINTEGRATING ORAL EVERY 8 HOURS PRN
Status: DISCONTINUED | OUTPATIENT
Start: 2023-02-28 | End: 2023-02-28 | Stop reason: HOSPADM

## 2023-02-28 RX ORDER — PROCHLORPERAZINE EDISYLATE 5 MG/ML
5 INJECTION INTRAMUSCULAR; INTRAVENOUS EVERY 6 HOURS PRN
Status: CANCELLED | OUTPATIENT
Start: 2023-02-28

## 2023-02-28 NOTE — NURSING
22 year old G 1 P 0 at 35.3 wks sent from clinic for an elevated BP. Denies vaginal bleeding, Denies leaking fluid. Fetus: fetal heart tones 125, positive fetal movements. Vital signs WNL. Educated on electronic fetal monitoring and assessments. Questions answered. Will update Dr. Espinal.

## 2023-02-28 NOTE — NURSING
Per LINDSAY Chavez. MD Teddy called and said patient okay to discharge after reviewing labs. Patient denied any symptoms of CHTN or labor. Fetal monitoring strip was reactive. No contractions. Patient given discharge instructions and education by LINDSAY Chavez. Patient ambulated off of unit.

## 2023-02-28 NOTE — NURSING
Notified Dr. Espinal of patients BP's which were in normal range. Waiting on P/C Ratio from Lab. Called and they said they did have the specimen in the lab but the chemistry personnel was not available to run it at that time. Teddy ordered CMP to be drawn and run. Discharge pending normal result from CMP.

## 2023-02-28 NOTE — PROGRESS NOTES
No c/o today   FHT normal   FH small  @ 35 3/7  1. IOB labs normal, CM enrolled, doing MT21, in complete, repeat set up with MFM; DM test done and 3rd trim labs ordered; consents done 1/3     2. ITP: seeing MFM and heme  -monthly CBC until 32 weeks then Q2 weeks until 36 weeks then weekly   - understands there is a change she may not be able to get epidural, saw ansthesia  -see MFM recommendations for medications/plt If needed     3. Depression/anxiety: stable     4. Anemia: getting iron infusions with heme     5. Bilateral choroid plexus cysts: declines MT21  6. YUDITH small, has MFM US Thursday   7. Elevated blood pressures, going to send to L&D for monitoring and labs     -going to do [facility] for peds     Face to Face time with patient: 25 minutes of total time spent on the encounter, which includes face to face time and non-face to face time preparing to see the patient (eg, review of tests), Obtaining and/or reviewing separately obtained history, Documenting clinical information in the electronic or other health record, Independently interpreting results (not separately reported) and communicating results to the patient/family/caregiver, or Care coordination (not separately reported).

## 2023-03-01 LAB — RPR SER QL: NORMAL

## 2023-03-02 ENCOUNTER — OFFICE VISIT (OUTPATIENT)
Dept: MATERNAL FETAL MEDICINE | Facility: CLINIC | Age: 23
End: 2023-03-02
Payer: MEDICAID

## 2023-03-02 ENCOUNTER — PROCEDURE VISIT (OUTPATIENT)
Dept: MATERNAL FETAL MEDICINE | Facility: CLINIC | Age: 23
End: 2023-03-02
Payer: MEDICAID

## 2023-03-02 VITALS
SYSTOLIC BLOOD PRESSURE: 118 MMHG | WEIGHT: 156.75 LBS | HEIGHT: 70 IN | BODY MASS INDEX: 22.44 KG/M2 | DIASTOLIC BLOOD PRESSURE: 78 MMHG

## 2023-03-02 DIAGNOSIS — O99.891 CURRENT MATERNAL CONDITION AFFECTING PREGNANCY: ICD-10-CM

## 2023-03-02 DIAGNOSIS — D69.3 IDIOPATHIC THROMBOCYTOPENIC PURPURA (ITP): Primary | ICD-10-CM

## 2023-03-02 DIAGNOSIS — Z36.4 ULTRASOUND FOR ANTENATAL SCREENING FOR FETAL GROWTH RESTRICTION: ICD-10-CM

## 2023-03-02 DIAGNOSIS — Z3A.35 35 WEEKS GESTATION OF PREGNANCY: ICD-10-CM

## 2023-03-02 DIAGNOSIS — Z36.89 ENCOUNTER FOR ULTRASOUND TO CHECK FETAL GROWTH: ICD-10-CM

## 2023-03-02 DIAGNOSIS — O99.013 ANEMIA DURING PREGNANCY IN THIRD TRIMESTER: ICD-10-CM

## 2023-03-02 DIAGNOSIS — O36.5930 POOR FETAL GROWTH AFFECTING MANAGEMENT OF MOTHER IN THIRD TRIMESTER, SINGLE OR UNSPECIFIED FETUS: ICD-10-CM

## 2023-03-02 PROCEDURE — 1159F MED LIST DOCD IN RCRD: CPT | Mod: CPTII,,, | Performed by: OBSTETRICS & GYNECOLOGY

## 2023-03-02 PROCEDURE — 76819 FETAL BIOPHYS PROFIL W/O NST: CPT | Mod: 26,S$PBB,, | Performed by: OBSTETRICS & GYNECOLOGY

## 2023-03-02 PROCEDURE — 99215 PR OFFICE/OUTPT VISIT, EST, LEVL V, 40-54 MIN: ICD-10-PCS | Mod: 25,S$PBB,TH, | Performed by: OBSTETRICS & GYNECOLOGY

## 2023-03-02 PROCEDURE — 3008F BODY MASS INDEX DOCD: CPT | Mod: CPTII,,, | Performed by: OBSTETRICS & GYNECOLOGY

## 2023-03-02 PROCEDURE — 3078F PR MOST RECENT DIASTOLIC BLOOD PRESSURE < 80 MM HG: ICD-10-PCS | Mod: CPTII,,, | Performed by: OBSTETRICS & GYNECOLOGY

## 2023-03-02 PROCEDURE — 76816 PR  US,PREGNANT UTERUS,F/U,TRANSABD APP: ICD-10-PCS | Mod: 26,S$PBB,, | Performed by: OBSTETRICS & GYNECOLOGY

## 2023-03-02 PROCEDURE — 3078F DIAST BP <80 MM HG: CPT | Mod: CPTII,,, | Performed by: OBSTETRICS & GYNECOLOGY

## 2023-03-02 PROCEDURE — 76819 FETAL BIOPHYS PROFIL W/O NST: CPT | Mod: PBBFAC,PO | Performed by: OBSTETRICS & GYNECOLOGY

## 2023-03-02 PROCEDURE — 76816 OB US FOLLOW-UP PER FETUS: CPT | Mod: 26,S$PBB,, | Performed by: OBSTETRICS & GYNECOLOGY

## 2023-03-02 PROCEDURE — 3074F PR MOST RECENT SYSTOLIC BLOOD PRESSURE < 130 MM HG: ICD-10-PCS | Mod: CPTII,,, | Performed by: OBSTETRICS & GYNECOLOGY

## 2023-03-02 PROCEDURE — 99999 PR PBB SHADOW E&M-EST. PATIENT-LVL II: ICD-10-PCS | Mod: PBBFAC,,, | Performed by: OBSTETRICS & GYNECOLOGY

## 2023-03-02 PROCEDURE — 3074F SYST BP LT 130 MM HG: CPT | Mod: CPTII,,, | Performed by: OBSTETRICS & GYNECOLOGY

## 2023-03-02 PROCEDURE — 76820 PR US, OB DOPPLER, FETAL UMBILICAL ARTERY ECHO: ICD-10-PCS | Mod: 26,S$PBB,, | Performed by: OBSTETRICS & GYNECOLOGY

## 2023-03-02 PROCEDURE — 3008F PR BODY MASS INDEX (BMI) DOCUMENTED: ICD-10-PCS | Mod: CPTII,,, | Performed by: OBSTETRICS & GYNECOLOGY

## 2023-03-02 PROCEDURE — 76820 UMBILICAL ARTERY ECHO: CPT | Mod: 26,S$PBB,, | Performed by: OBSTETRICS & GYNECOLOGY

## 2023-03-02 PROCEDURE — 76819 PR US, OB, FETAL BIOPHYSICAL, W/O NST: ICD-10-PCS | Mod: 26,S$PBB,, | Performed by: OBSTETRICS & GYNECOLOGY

## 2023-03-02 PROCEDURE — 76816 OB US FOLLOW-UP PER FETUS: CPT | Mod: PBBFAC,PO | Performed by: OBSTETRICS & GYNECOLOGY

## 2023-03-02 PROCEDURE — 99215 OFFICE O/P EST HI 40 MIN: CPT | Mod: 25,S$PBB,TH, | Performed by: OBSTETRICS & GYNECOLOGY

## 2023-03-02 PROCEDURE — 76820 UMBILICAL ARTERY ECHO: CPT | Mod: PBBFAC,PO | Performed by: OBSTETRICS & GYNECOLOGY

## 2023-03-02 PROCEDURE — 99212 OFFICE O/P EST SF 10 MIN: CPT | Mod: PBBFAC,TH,PO | Performed by: OBSTETRICS & GYNECOLOGY

## 2023-03-02 PROCEDURE — 99999 PR PBB SHADOW E&M-EST. PATIENT-LVL II: CPT | Mod: PBBFAC,,, | Performed by: OBSTETRICS & GYNECOLOGY

## 2023-03-02 PROCEDURE — 1159F PR MEDICATION LIST DOCUMENTED IN MEDICAL RECORD: ICD-10-PCS | Mod: CPTII,,, | Performed by: OBSTETRICS & GYNECOLOGY

## 2023-03-03 ENCOUNTER — TELEPHONE (OUTPATIENT)
Dept: OBSTETRICS AND GYNECOLOGY | Facility: CLINIC | Age: 23
End: 2023-03-03
Payer: MEDICAID

## 2023-03-03 DIAGNOSIS — D69.3 CHRONIC ITP (IDIOPATHIC THROMBOCYTOPENIA): Primary | ICD-10-CM

## 2023-03-03 LAB — BACTERIA SPEC AEROBE CULT: NORMAL

## 2023-03-06 ENCOUNTER — HOSPITAL ENCOUNTER (OUTPATIENT)
Dept: OBSTETRICS AND GYNECOLOGY | Facility: HOSPITAL | Age: 23
Discharge: HOME | End: 2023-03-06
Attending: OBSTETRICS & GYNECOLOGY
Payer: MEDICAID

## 2023-03-06 ENCOUNTER — INFUSION (OUTPATIENT)
Dept: INFUSION THERAPY | Facility: HOSPITAL | Age: 23
End: 2023-03-06
Attending: INTERNAL MEDICINE
Payer: MEDICAID

## 2023-03-06 VITALS
RESPIRATION RATE: 18 BRPM | HEIGHT: 70 IN | OXYGEN SATURATION: 99 % | HEART RATE: 70 BPM | WEIGHT: 156.75 LBS | TEMPERATURE: 98 F | DIASTOLIC BLOOD PRESSURE: 73 MMHG | BODY MASS INDEX: 22.44 KG/M2 | SYSTOLIC BLOOD PRESSURE: 113 MMHG

## 2023-03-06 DIAGNOSIS — E61.1 IRON DEFICIENCY: Primary | ICD-10-CM

## 2023-03-06 DIAGNOSIS — O99.019 IRON DEFICIENCY ANEMIA DURING PREGNANCY: ICD-10-CM

## 2023-03-06 DIAGNOSIS — D50.9 IRON DEFICIENCY ANEMIA DURING PREGNANCY: Primary | ICD-10-CM

## 2023-03-06 DIAGNOSIS — O99.019 IRON DEFICIENCY ANEMIA DURING PREGNANCY: Primary | ICD-10-CM

## 2023-03-06 DIAGNOSIS — D50.9 IRON DEFICIENCY ANEMIA DURING PREGNANCY: ICD-10-CM

## 2023-03-06 PROCEDURE — 96365 THER/PROPH/DIAG IV INF INIT: CPT

## 2023-03-06 PROCEDURE — 59025 FETAL NON-STRESS TEST: CPT

## 2023-03-06 PROCEDURE — 59025 FETAL NON-STRESS TEST: CPT | Mod: 26,,, | Performed by: OBSTETRICS & GYNECOLOGY

## 2023-03-06 PROCEDURE — 25000003 PHARM REV CODE 250: Performed by: INTERNAL MEDICINE

## 2023-03-06 PROCEDURE — 59025 PR FETAL 2N-STRESS TEST: ICD-10-PCS | Mod: 26,,, | Performed by: OBSTETRICS & GYNECOLOGY

## 2023-03-06 PROCEDURE — A4216 STERILE WATER/SALINE, 10 ML: HCPCS | Performed by: INTERNAL MEDICINE

## 2023-03-06 PROCEDURE — 63600175 PHARM REV CODE 636 W HCPCS: Performed by: INTERNAL MEDICINE

## 2023-03-06 RX ORDER — SODIUM CHLORIDE 0.9 % (FLUSH) 0.9 %
10 SYRINGE (ML) INJECTION
Status: DISCONTINUED | OUTPATIENT
Start: 2023-03-06 | End: 2023-03-06 | Stop reason: HOSPADM

## 2023-03-06 RX ADMIN — Medication 10 ML: at 01:03

## 2023-03-06 RX ADMIN — IRON SUCROSE 200 MG: 20 INJECTION, SOLUTION INTRAVENOUS at 12:03

## 2023-03-06 RX ADMIN — SODIUM CHLORIDE: 9 INJECTION, SOLUTION INTRAVENOUS at 11:03

## 2023-03-06 NOTE — NURSING
Pt tolerated Venofer infusion 1 of 4 well.  No adverse reaction noted. IV flushed with NS and D/C per protocol. AVS printed and given to pt.  Patient left clinic in no acute distress.

## 2023-03-06 NOTE — PROGRESS NOTES
"PATIENT: Tamica Ware  MRN: [id number]  DATE: 3/7/2023    Diagnosis:   1. 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 Microcytosis    3. Iron deficiency    4. Chronic ITP (idiopathic thrombocytopenia)      Chief Complaint: thrombocytopenia    Oncologic History:      Oncologic History     Oncologic Treatment     Pathology       Subjective:    History of Present Illness: Ms. Ware is a 22 y.o. female who presented in October 2022 for evaluation and management of immune thrombocytopenia during pregnancy. She was referred by Dr. Espinal.    - she has previously seen pediatric oncology for there thrombocytopenia. Information per Dr. Jordan's note dated 11/10/20:  " her bone marrow biopsy from 12/05 showed no evidence of leukemia or MDS.    - Her vWF antigen and activity and multimers were all normal.   - At 1st visit, FATUMA was negative, CMV was negative, EBV was positive for IgG but negative for IgM.    - I had suspected a viral/post-viral etiology or possibly autoimmune issue or possibly a primary platelet disorder. No schistocytes or significant anemia to suggest TTP.    - Platelet aggregation studies were not interpretable .   - HIV and Hep C antibody were negative.    - Testing for primary platelet disorder (glycoprotein testing, Type 2N vWF) were normal.   - today, she reports multiple symptoms including joint pain, photophobia and light-headedness.  She reports bruising and petechiae but on exam, had only a few small bruises on lower extremities and a few scattered petechiae  - had an extensive work-up in 2017 including bone marrow, platelet studies and vWD work-up which were negative. 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, chronic ITP  - her platelet count today was 86K with a mild anemia (Hb 11.4) and normal WBC count and differential  - review of platelet count over the last 2 years shows a range of 66 to 101K  - never had significant bleeding after " "surgery (T and A in 2017), dental procedures or piercings   - we again discussed the possibility of an auto-immune disorder  - sent CRP and will follow-up  - she had been referred to rheumatology when I last saw her but did not schedule visit  - I am again  referring to rheumatology (Dr Del Rosario)  - if autoimmune work-up is negative, will consider repeating platelet aggregation studies."  - she received iron sucrose x 4 doses in November 2022. She noted an improvement in her fatigue.      Interval history:  - she presents for a follow-up appointment for her immune thrombocytopenia during pregnancy.  - she is currently 36 weeks, 3 days pregnant.  - she received iron sucrose on 3/6/23.  - today, she endorses fatigue, nausea, constipation. She denies shortness of breath, chest pain, vomiting, diarrhea.      Past medical, surgical, family, and social histories have been reviewed and updated below.    Past Medical History:   Past Medical History:   Diagnosis Date    Suicidal ideations     Thrombocytopenia     Urinary tract infection        Past Surgical History:   Past Surgical History:   Procedure Laterality Date    TONSILLECTOMY         Family History:   Family History   Problem Relation Age of Onset    Breast cancer Neg Hx     Colon cancer Neg Hx     Ovarian cancer Neg Hx        Social History:  reports that she has quit smoking. Her smoking use included vaping with nicotine. She started smoking about 3 years ago. She has never used smokeless tobacco. She reports that she does not drink alcohol and does not use drugs.    Allergies:  Review of patient's allergies indicates:  No Known Allergies    Medications:  Current Outpatient Medications   Medication Sig Dispense Refill    ferrous sulfate (FEOSOL) 325 mg (65 mg iron) Tab tablet Take 1 tablet (325 mg total) by mouth once daily. 90 tablet 3    ondansetron (ZOFRAN-ODT) 4 MG TbDL Take 1 tablet (4 mg total) by mouth every 8 (eight) hours as needed. 14 tablet 1    PNV cmb " 52/iron/FA/omega-3/dha (COMPLETE YUNG DHA ORAL) Take by mouth.       No current facility-administered medications for this visit.     Facility-Administered Medications Ordered in Other Visits   Medication Dose Route Frequency Provider Last Rate Last Admin    iron sucrose (VENOFER) 200 mg in sodium chloride 0.9% 100 mL IVPB  200 mg Intravenous 1 time in Clinic/HOD Curtis Cope  mL/hr at 23 1219 200 mg at 23 1219    sodium chloride 0.9% flush 10 mL  10 mL Intravenous PRN Curtis Cope MD           Review of Systems   Constitutional:  Positive for fatigue.   HENT:  Negative for sore throat.    Eyes:  Negative for visual disturbance.   Respiratory:  Negative for cough and shortness of breath.    Cardiovascular:  Negative for chest pain.   Gastrointestinal:  Positive for constipation and nausea. Negative for abdominal pain, diarrhea and vomiting.   Genitourinary:  Negative for dysuria.   Musculoskeletal:  Negative for back pain.   Skin:  Negative for rash.   Neurological:  Negative for headaches.   Hematological:  Negative for adenopathy.   Psychiatric/Behavioral:  The patient is not nervous/anxious.      ECOG Performance Status:   ECOG SCORE 1            Objective:      Vitals:   Vitals:    23 0958   BP: 114/72   Pulse: 88   SpO2: 99%   Weight: 72.5 kg (159 lb 13.3 oz)     BMI: Body mass index is 22.93 kg/m².    Physical Exam  Vitals and nursing note reviewed.   Constitutional:       Appearance: She is well-developed.   HENT:      Head: Normocephalic and atraumatic.   Eyes:      Pupils: Pupils are equal, round, and reactive to light.   Cardiovascular:      Rate and Rhythm: Normal rate and regular rhythm.   Pulmonary:      Effort: Pulmonary effort is normal.      Breath sounds: Normal breath sounds.   Abdominal:      General: Bowel sounds are normal.      Palpations: Abdomen is soft.   Musculoskeletal:         General: Normal range of motion.      Cervical back: Normal range of motion and  neck supple.   Skin:     General: Skin is warm and dry.   Neurological:      Mental Status: She is alert and oriented to person, place, and time.   Psychiatric:         Behavior: Behavior normal.         Thought Content: Thought content normal.         Judgment: Judgment normal.       Laboratory Data:  Labs have been reviewed.    Lab Results   Component Value Date    WBC 8.99 03/07/2023    HGB 14.1 03/07/2023    HCT 40.5 03/07/2023    MCV 86 03/07/2023    PLT 69 (L) 03/07/2023           Imaging:    Assessment:       1. 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 Microcytosis    3. Iron deficiency    4. Chronic ITP (idiopathic thrombocytopenia)           Plan:     Thrombocytopenia during pregnancy  - I have reviewed her chart  - labs since 2016 reveal a moderate thrombocytopenia  - she had undergone extensive workup previously with pediatric oncology.  - I feel she has immune thrombocytopenia  - Labs have been reviewed. Platelet count today is 69 k/uL.  - she will likely be induced during the week of 3/20/23. We will coordinate therapy a week beforehand to increase her platelet count.  - I prescribed a dexamethasone course for her to take during the week of 3/13/23.  - we will arrange for immune globulin x 1 -2 doses during the week of 3/13/23.  - repeat labs weekly.    2. Iron deficiency  - iron studies (10/27/22) revealed severe iron deficiency  - she received iron sucrose x 4 doses in November 2022. She noted an improvement in her fatigue.  - follow-up iron studies (January 2023) revealed continued iron deficiency.  - she received iron sucrose on 3/6/23.  - continue iron sucrose through delivery.    3. Advance Care Planning     Power of   After our discussion (at previous visit), the patient decided to complete a HCPOA and appointed her  mother-in-law Sunitha Moran ([phone]) and dad Bill Ware ([phone]) .     - repeat labs weekly.  - return to clinic in 3  months.    Curtis Cope M.D.  Hematology/Oncology  [facility] - [address], [address]  [place] [address]  Phone: [phone]  Fax: [phone]

## 2023-03-07 ENCOUNTER — LAB VISIT (OUTPATIENT)
Dept: LAB | Facility: HOSPITAL | Age: 23
End: 2023-03-07
Attending: INTERNAL MEDICINE
Payer: MEDICAID

## 2023-03-07 ENCOUNTER — TELEPHONE (OUTPATIENT)
Dept: INFUSION THERAPY | Facility: HOSPITAL | Age: 23
End: 2023-03-07
Payer: MEDICAID

## 2023-03-07 ENCOUNTER — PATIENT MESSAGE (OUTPATIENT)
Dept: OBSTETRICS AND GYNECOLOGY | Facility: CLINIC | Age: 23
End: 2023-03-07

## 2023-03-07 ENCOUNTER — OFFICE VISIT (OUTPATIENT)
Dept: HEMATOLOGY/ONCOLOGY | Facility: CLINIC | Age: 23
End: 2023-03-07
Payer: MEDICAID

## 2023-03-07 ENCOUNTER — ROUTINE PRENATAL (OUTPATIENT)
Dept: OBSTETRICS AND GYNECOLOGY | Facility: CLINIC | Age: 23
End: 2023-03-07
Payer: MEDICAID

## 2023-03-07 VITALS
BODY MASS INDEX: 23.16 KG/M2 | DIASTOLIC BLOOD PRESSURE: 80 MMHG | WEIGHT: 161.38 LBS | SYSTOLIC BLOOD PRESSURE: 115 MMHG

## 2023-03-07 VITALS
OXYGEN SATURATION: 99 % | BODY MASS INDEX: 22.93 KG/M2 | WEIGHT: 159.81 LBS | HEART RATE: 88 BPM | SYSTOLIC BLOOD PRESSURE: 114 MMHG | DIASTOLIC BLOOD PRESSURE: 72 MMHG

## 2023-03-07 DIAGNOSIS — E61.1 IRON DEFICIENCY: ICD-10-CM

## 2023-03-07 DIAGNOSIS — D69.3 IMMUNE THROMBOCYTOPENIA AFFECTING PREGNANCY IN THIRD TRIMESTER: Primary | ICD-10-CM

## 2023-03-07 DIAGNOSIS — O99.113 IMMUNE THROMBOCYTOPENIA AFFECTING PREGNANCY IN THIRD TRIMESTER: Primary | ICD-10-CM

## 2023-03-07 DIAGNOSIS — Z86.39 HISTORY OF IRON DEFICIENCY: ICD-10-CM

## 2023-03-07 DIAGNOSIS — R71.8 MICROCYTOSIS: ICD-10-CM

## 2023-03-07 DIAGNOSIS — D69.3 IMMUNE THROMBOCYTOPENIA AFFECTING PREGNANCY IN SECOND TRIMESTER: ICD-10-CM

## 2023-03-07 DIAGNOSIS — O99.112 IMMUNE THROMBOCYTOPENIA AFFECTING PREGNANCY IN SECOND TRIMESTER: ICD-10-CM

## 2023-03-07 DIAGNOSIS — D69.3 CHRONIC ITP (IDIOPATHIC THROMBOCYTOPENIA): ICD-10-CM

## 2023-03-07 DIAGNOSIS — Z01.818 PRE-OP EXAM: ICD-10-CM

## 2023-03-07 LAB
BASOPHILS # BLD AUTO: 0.03 K/UL (ref 0–0.2)
BASOPHILS NFR BLD: 0.3 % (ref 0–1.9)
DIFFERENTIAL METHOD: ABNORMAL
EOSINOPHIL # BLD AUTO: 0.1 K/UL (ref 0–0.5)
EOSINOPHIL NFR BLD: 0.8 % (ref 0–8)
ERYTHROCYTE [DISTWIDTH] IN BLOOD BY AUTOMATED COUNT: 13.1 % (ref 11.5–14.5)
HCT VFR BLD AUTO: 40.5 % (ref 37–48.5)
HGB BLD-MCNC: 14.1 G/DL (ref 12–16)
IMM GRANULOCYTES # BLD AUTO: 0.08 K/UL (ref 0–0.04)
IMM GRANULOCYTES NFR BLD AUTO: 0.9 % (ref 0–0.5)
LYMPHOCYTES # BLD AUTO: 1.9 K/UL (ref 1–4.8)
LYMPHOCYTES NFR BLD: 21.6 % (ref 18–48)
MCH RBC QN AUTO: 30 PG (ref 27–31)
MCHC RBC AUTO-ENTMCNC: 34.8 G/DL (ref 32–36)
MCV RBC AUTO: 86 FL (ref 82–98)
MONOCYTES # BLD AUTO: 0.4 K/UL (ref 0.3–1)
MONOCYTES NFR BLD: 4.7 % (ref 4–15)
NEUTROPHILS # BLD AUTO: 6.5 K/UL (ref 1.8–7.7)
NEUTROPHILS NFR BLD: 71.7 % (ref 38–73)
NRBC BLD-RTO: 0 /100 WBC
PLATELET # BLD AUTO: 69 K/UL (ref 150–450)
PMV BLD AUTO: 13.8 FL (ref 9.2–12.9)
RBC # BLD AUTO: 4.7 M/UL (ref 4–5.4)
WBC # BLD AUTO: 8.99 K/UL (ref 3.9–12.7)

## 2023-03-07 PROCEDURE — 36415 COLL VENOUS BLD VENIPUNCTURE: CPT | Performed by: INTERNAL MEDICINE

## 2023-03-07 PROCEDURE — 99213 OFFICE O/P EST LOW 20 MIN: CPT | Mod: PBBFAC,PO | Performed by: INTERNAL MEDICINE

## 2023-03-07 PROCEDURE — 3078F PR MOST RECENT DIASTOLIC BLOOD PRESSURE < 80 MM HG: ICD-10-PCS | Mod: CPTII,,, | Performed by: INTERNAL MEDICINE

## 2023-03-07 PROCEDURE — 99999 PR PBB SHADOW E&M-EST. PATIENT-LVL III: CPT | Mod: PBBFAC,,, | Performed by: INTERNAL MEDICINE

## 2023-03-07 PROCEDURE — 99214 PR OFFICE/OUTPT VISIT, EST, LEVL IV, 30-39 MIN: ICD-10-PCS | Mod: TH,S$PBB,, | Performed by: OBSTETRICS & GYNECOLOGY

## 2023-03-07 PROCEDURE — 3008F PR BODY MASS INDEX (BMI) DOCUMENTED: ICD-10-PCS | Mod: CPTII,,, | Performed by: INTERNAL MEDICINE

## 2023-03-07 PROCEDURE — 1160F PR REVIEW ALL MEDS BY PRESCRIBER/CLIN PHARMACIST DOCUMENTED: ICD-10-PCS | Mod: CPTII,,, | Performed by: INTERNAL MEDICINE

## 2023-03-07 PROCEDURE — 85025 COMPLETE CBC W/AUTO DIFF WBC: CPT | Performed by: INTERNAL MEDICINE

## 2023-03-07 PROCEDURE — 90715 TDAP VACCINE 7 YRS/> IM: CPT | Mod: PBBFAC,PN | Performed by: OBSTETRICS & GYNECOLOGY

## 2023-03-07 PROCEDURE — 3008F BODY MASS INDEX DOCD: CPT | Mod: CPTII,,, | Performed by: INTERNAL MEDICINE

## 2023-03-07 PROCEDURE — 99214 PR OFFICE/OUTPT VISIT, EST, LEVL IV, 30-39 MIN: ICD-10-PCS | Mod: S$PBB,,, | Performed by: INTERNAL MEDICINE

## 2023-03-07 PROCEDURE — 1159F PR MEDICATION LIST DOCUMENTED IN MEDICAL RECORD: ICD-10-PCS | Mod: CPTII,,, | Performed by: INTERNAL MEDICINE

## 2023-03-07 PROCEDURE — 99214 OFFICE O/P EST MOD 30 MIN: CPT | Mod: TH,S$PBB,, | Performed by: OBSTETRICS & GYNECOLOGY

## 2023-03-07 PROCEDURE — 99999 PR PBB SHADOW E&M-EST. PATIENT-LVL III: ICD-10-PCS | Mod: PBBFAC,,, | Performed by: OBSTETRICS & GYNECOLOGY

## 2023-03-07 PROCEDURE — 3074F SYST BP LT 130 MM HG: CPT | Mod: CPTII,,, | Performed by: INTERNAL MEDICINE

## 2023-03-07 PROCEDURE — 99214 OFFICE O/P EST MOD 30 MIN: CPT | Mod: S$PBB,,, | Performed by: INTERNAL MEDICINE

## 2023-03-07 PROCEDURE — 99999 PR PBB SHADOW E&M-EST. PATIENT-LVL III: ICD-10-PCS | Mod: PBBFAC,,, | Performed by: INTERNAL MEDICINE

## 2023-03-07 PROCEDURE — 3078F DIAST BP <80 MM HG: CPT | Mod: CPTII,,, | Performed by: INTERNAL MEDICINE

## 2023-03-07 PROCEDURE — 99999 PR PBB SHADOW E&M-EST. PATIENT-LVL III: CPT | Mod: PBBFAC,,, | Performed by: OBSTETRICS & GYNECOLOGY

## 2023-03-07 PROCEDURE — 1159F MED LIST DOCD IN RCRD: CPT | Mod: CPTII,,, | Performed by: INTERNAL MEDICINE

## 2023-03-07 PROCEDURE — 3074F PR MOST RECENT SYSTOLIC BLOOD PRESSURE < 130 MM HG: ICD-10-PCS | Mod: CPTII,,, | Performed by: INTERNAL MEDICINE

## 2023-03-07 PROCEDURE — 1160F RVW MEDS BY RX/DR IN RCRD: CPT | Mod: CPTII,,, | Performed by: INTERNAL MEDICINE

## 2023-03-07 PROCEDURE — 99213 OFFICE O/P EST LOW 20 MIN: CPT | Mod: PBBFAC,27,TH,PN | Performed by: OBSTETRICS & GYNECOLOGY

## 2023-03-07 RX ORDER — FAMOTIDINE 10 MG/ML
20 INJECTION INTRAVENOUS
Status: CANCELLED | OUTPATIENT
Start: 2023-03-13

## 2023-03-07 RX ORDER — ACETAMINOPHEN 325 MG/1
650 TABLET ORAL
Status: CANCELLED | OUTPATIENT
Start: 2023-03-13

## 2023-03-07 RX ORDER — DEXAMETHASONE 4 MG/1
40 TABLET ORAL SEE ADMIN INSTRUCTIONS
Qty: 40 TABLET | Refills: 0 | Status: SHIPPED | OUTPATIENT
Start: 2023-03-13 | End: 2023-03-17

## 2023-03-07 RX ORDER — DIPHENHYDRAMINE HYDROCHLORIDE 50 MG/ML
25 INJECTION INTRAMUSCULAR; INTRAVENOUS
Status: CANCELLED | OUTPATIENT
Start: 2023-03-13

## 2023-03-07 RX ORDER — HEPARIN 100 UNIT/ML
500 SYRINGE INTRAVENOUS
Status: CANCELLED | OUTPATIENT
Start: 2023-03-13

## 2023-03-07 RX ORDER — SODIUM CHLORIDE 0.9 % (FLUSH) 0.9 %
10 SYRINGE (ML) INJECTION
Status: CANCELLED | OUTPATIENT
Start: 2023-03-13

## 2023-03-07 NOTE — PROGRESS NOTES
No c/o today   FHT normal   FH small  @ 36 3/7  1. IOB labs normal, CM enrolled, doing MT21, in complete, repeat set up with MFM; DM test done and 3rd trim labs ordered; consents done 1/3     2. ITP: seeing MFM and heme  -monthly CBC until 32 weeks then Q2 weeks until 36 weeks then weekly   - understands there is a change she may not be able to get epidural, saw ansthesia  -see MFM recommendations for medications/plt If needed  -heme is going to do steroids and IVIG to try to get plt count over 70k     3. Depression/anxiety: stable     4. Anemia: getting iron infusions with heme     5. Bilateral choroid plexus cysts: declines MT21  6. IUGR: twice weekly testing set up  7.breech: discussed choices for breech baby close to term. Discussed expectant management,  or ECV. Discussed risks and benefits of each. Discussed risks of ECV including abruption, PTL, ROM, fetal intolerance that leads to . She expressed her understanding. She would like to proceed with ECV    -going to do [facility] for peds     Face to Face time with patient: 30 minutes of total time spent on the encounter, which includes face to face time and non-face to face time preparing to see the patient (eg, review of tests), Obtaining and/or reviewing separately obtained history, Documenting clinical information in the electronic or other health record, Independently interpreting results (not separately reported) and communicating results to the patient/family/caregiver, or Care coordination (not separately reported).

## 2023-03-07 NOTE — TELEPHONE ENCOUNTER
----- Message from Curtis Cope MD sent at 3/7/2023 10:19 AM CST -----  1. Can we set her up for immune globulin when she comes in for iron sucrose next week?    Thanks!

## 2023-03-08 ENCOUNTER — PATIENT MESSAGE (OUTPATIENT)
Dept: OBSTETRICS AND GYNECOLOGY | Facility: CLINIC | Age: 23
End: 2023-03-08
Payer: MEDICAID

## 2023-03-08 ENCOUNTER — TELEPHONE (OUTPATIENT)
Dept: OBSTETRICS AND GYNECOLOGY | Facility: CLINIC | Age: 23
End: 2023-03-08
Payer: MEDICAID

## 2023-03-08 NOTE — TELEPHONE ENCOUNTER
Discussed version with MFM who would not recommend since fetus could have some platelet issues. Discussed with patient and she expressed her understanding. Will monitor expectantly and set up .

## 2023-03-09 ENCOUNTER — HOSPITAL ENCOUNTER (OUTPATIENT)
Dept: RADIOLOGY | Facility: HOSPITAL | Age: 23
Discharge: HOME | End: 2023-03-09
Attending: OBSTETRICS & GYNECOLOGY
Payer: MEDICAID

## 2023-03-09 DIAGNOSIS — D69.3 CHRONIC ITP (IDIOPATHIC THROMBOCYTOPENIA): ICD-10-CM

## 2023-03-09 PROCEDURE — 76815 OB US LIMITED FETUS(S): CPT | Mod: TC

## 2023-03-09 PROCEDURE — 76819 FETAL BIOPHYS PROFIL W/O NST: CPT | Mod: 26,,, | Performed by: RADIOLOGY

## 2023-03-09 PROCEDURE — 76815 US OB LIMITED WITH BIOPHYSICAL PROFILE (XPD): ICD-10-PCS | Mod: 26,,, | Performed by: RADIOLOGY

## 2023-03-09 PROCEDURE — 76815 OB US LIMITED FETUS(S): CPT | Mod: 26,,, | Performed by: RADIOLOGY

## 2023-03-09 PROCEDURE — 76819 US OB LIMITED WITH BIOPHYSICAL PROFILE (XPD): ICD-10-PCS | Mod: 26,,, | Performed by: RADIOLOGY

## 2023-03-13 ENCOUNTER — INFUSION (OUTPATIENT)
Dept: INFUSION THERAPY | Facility: HOSPITAL | Age: 23
End: 2023-03-13
Attending: INTERNAL MEDICINE
Payer: MEDICAID

## 2023-03-13 VITALS
HEART RATE: 73 BPM | TEMPERATURE: 98 F | BODY MASS INDEX: 23.31 KG/M2 | OXYGEN SATURATION: 99 % | RESPIRATION RATE: 17 BRPM | SYSTOLIC BLOOD PRESSURE: 107 MMHG | HEIGHT: 70 IN | WEIGHT: 162.81 LBS | DIASTOLIC BLOOD PRESSURE: 56 MMHG

## 2023-03-13 DIAGNOSIS — O99.113 IMMUNE THROMBOCYTOPENIA AFFECTING PREGNANCY IN THIRD TRIMESTER: Primary | ICD-10-CM

## 2023-03-13 DIAGNOSIS — D69.3 IMMUNE THROMBOCYTOPENIA AFFECTING PREGNANCY IN THIRD TRIMESTER: Primary | ICD-10-CM

## 2023-03-13 DIAGNOSIS — D69.3 CHRONIC ITP (IDIOPATHIC THROMBOCYTOPENIA): ICD-10-CM

## 2023-03-13 PROCEDURE — 96375 TX/PRO/DX INJ NEW DRUG ADDON: CPT

## 2023-03-13 PROCEDURE — 63600175 PHARM REV CODE 636 W HCPCS: Performed by: INTERNAL MEDICINE

## 2023-03-13 PROCEDURE — 25000003 PHARM REV CODE 250: Performed by: INTERNAL MEDICINE

## 2023-03-13 PROCEDURE — 96365 THER/PROPH/DIAG IV INF INIT: CPT

## 2023-03-13 PROCEDURE — A4216 STERILE WATER/SALINE, 10 ML: HCPCS | Performed by: INTERNAL MEDICINE

## 2023-03-13 PROCEDURE — 96366 THER/PROPH/DIAG IV INF ADDON: CPT

## 2023-03-13 RX ORDER — ACETAMINOPHEN 325 MG/1
650 TABLET ORAL
OUTPATIENT
Start: 2023-04-10

## 2023-03-13 RX ORDER — HEPARIN 100 UNIT/ML
500 SYRINGE INTRAVENOUS
OUTPATIENT
Start: 2023-04-10

## 2023-03-13 RX ORDER — DIPHENHYDRAMINE HYDROCHLORIDE 50 MG/ML
25 INJECTION INTRAMUSCULAR; INTRAVENOUS
Status: CANCELLED | OUTPATIENT
Start: 2023-04-10

## 2023-03-13 RX ORDER — SODIUM CHLORIDE 0.9 % (FLUSH) 0.9 %
10 SYRINGE (ML) INJECTION
OUTPATIENT
Start: 2023-04-10

## 2023-03-13 RX ORDER — SODIUM CHLORIDE 0.9 % (FLUSH) 0.9 %
10 SYRINGE (ML) INJECTION
Status: DISCONTINUED | OUTPATIENT
Start: 2023-03-13 | End: 2023-03-13 | Stop reason: HOSPADM

## 2023-03-13 RX ORDER — FAMOTIDINE 10 MG/ML
20 INJECTION INTRAVENOUS
Status: COMPLETED | OUTPATIENT
Start: 2023-03-13 | End: 2023-03-13

## 2023-03-13 RX ORDER — FAMOTIDINE 10 MG/ML
20 INJECTION INTRAVENOUS
OUTPATIENT
Start: 2023-04-10

## 2023-03-13 RX ORDER — ACETAMINOPHEN 325 MG/1
650 TABLET ORAL
Status: COMPLETED | OUTPATIENT
Start: 2023-03-13 | End: 2023-03-13

## 2023-03-13 RX ORDER — DIPHENHYDRAMINE HYDROCHLORIDE 50 MG/ML
25 INJECTION INTRAMUSCULAR; INTRAVENOUS
Status: COMPLETED | OUTPATIENT
Start: 2023-03-13 | End: 2023-03-13

## 2023-03-13 RX ADMIN — FAMOTIDINE 20 MG: 10 INJECTION INTRAVENOUS at 10:03

## 2023-03-13 RX ADMIN — DIPHENHYDRAMINE HYDROCHLORIDE 25 MG: 50 INJECTION INTRAMUSCULAR; INTRAVENOUS at 10:03

## 2023-03-13 RX ADMIN — Medication 10 ML: at 04:03

## 2023-03-13 RX ADMIN — ACETAMINOPHEN 650 MG: 325 TABLET ORAL at 10:03

## 2023-03-13 RX ADMIN — HUMAN IMMUNOGLOBULIN G 75 G: 10 LIQUID INTRAVENOUS at 11:03

## 2023-03-13 NOTE — NURSING
Pt tolerated IVIG infusion well.  No adverse reaction noted.   IV flushed with NS and D/C per protocol.  Patient left clinic in no acute distress.

## 2023-03-14 ENCOUNTER — ROUTINE PRENATAL (OUTPATIENT)
Dept: OBSTETRICS AND GYNECOLOGY | Facility: CLINIC | Age: 23
End: 2023-03-14
Payer: MEDICAID

## 2023-03-14 VITALS
BODY MASS INDEX: 23.47 KG/M2 | DIASTOLIC BLOOD PRESSURE: 73 MMHG | WEIGHT: 163.56 LBS | SYSTOLIC BLOOD PRESSURE: 103 MMHG

## 2023-03-14 DIAGNOSIS — Z3A.37 37 WEEKS GESTATION OF PREGNANCY: Primary | ICD-10-CM

## 2023-03-14 DIAGNOSIS — O36.5930 POOR FETAL GROWTH AFFECTING MANAGEMENT OF MOTHER IN THIRD TRIMESTER, SINGLE OR UNSPECIFIED FETUS: ICD-10-CM

## 2023-03-14 PROCEDURE — 99213 OFFICE O/P EST LOW 20 MIN: CPT | Mod: TH,S$PBB,, | Performed by: OBSTETRICS & GYNECOLOGY

## 2023-03-14 PROCEDURE — 99999 PR PBB SHADOW E&M-EST. PATIENT-LVL III: ICD-10-PCS | Mod: PBBFAC,,, | Performed by: OBSTETRICS & GYNECOLOGY

## 2023-03-14 PROCEDURE — 99999 PR PBB SHADOW E&M-EST. PATIENT-LVL III: CPT | Mod: PBBFAC,,, | Performed by: OBSTETRICS & GYNECOLOGY

## 2023-03-14 PROCEDURE — 99213 PR OFFICE/OUTPT VISIT, EST, LEVL III, 20-29 MIN: ICD-10-PCS | Mod: TH,S$PBB,, | Performed by: OBSTETRICS & GYNECOLOGY

## 2023-03-14 PROCEDURE — 99213 OFFICE O/P EST LOW 20 MIN: CPT | Mod: PBBFAC,TH,PN | Performed by: OBSTETRICS & GYNECOLOGY

## 2023-03-14 NOTE — PROGRESS NOTES
No c/o today   FHT normal   FH small  @ 37 3/7  1. IOB labs normal, CM enrolled, doing MT21, in complete, repeat set up with MFM; DM test done and 3rd trim labs ordered; consents done 1/3     2. ITP: seeing MFM and heme  -monthly CBC until 32 weeks then Q2 weeks until 36 weeks then weekly   - understands there is a change she may not be able to get epidural, saw ansthesia  -see MFM recommendations for medications/plt If needed  -heme is going to do steroids and IVIG to try to get plt count over 70k     3. Depression/anxiety: stable     4. Anemia: getting iron infusions with heme     5. Bilateral choroid plexus cysts: declines MT21  6. IUGR: twice weekly testing set up; delivery 38-39 weeks     7.breech: discussed choices for breech baby close to term. MF would not recommend ECV so set up  for 3/22 at 730     -going to do [facility] for peds     Face to Face time with patient: 20 minutes of total time spent on the encounter, which includes face to face time and non-face to face time preparing to see the patient (eg, review of tests), Obtaining and/or reviewing separately obtained history, Documenting clinical information in the electronic or other health record, Independently interpreting results (not separately reported) and communicating results to the patient/family/caregiver, or Care coordination (not separately reported).

## 2023-03-17 ENCOUNTER — PATIENT MESSAGE (OUTPATIENT)
Dept: HEMATOLOGY/ONCOLOGY | Facility: CLINIC | Age: 23
End: 2023-03-17
Payer: MEDICAID

## 2023-03-20 ENCOUNTER — ROUTINE PRENATAL (OUTPATIENT)
Dept: OBSTETRICS AND GYNECOLOGY | Facility: CLINIC | Age: 23
End: 2023-03-20
Payer: MEDICAID

## 2023-03-20 ENCOUNTER — INFUSION (OUTPATIENT)
Dept: INFUSION THERAPY | Facility: HOSPITAL | Age: 23
End: 2023-03-20
Attending: INTERNAL MEDICINE
Payer: MEDICAID

## 2023-03-20 VITALS
SYSTOLIC BLOOD PRESSURE: 119 MMHG | HEIGHT: 70 IN | HEART RATE: 75 BPM | WEIGHT: 165.69 LBS | DIASTOLIC BLOOD PRESSURE: 73 MMHG | TEMPERATURE: 99 F | BODY MASS INDEX: 23.72 KG/M2 | OXYGEN SATURATION: 100 % | RESPIRATION RATE: 18 BRPM

## 2023-03-20 VITALS
WEIGHT: 165.69 LBS | SYSTOLIC BLOOD PRESSURE: 122 MMHG | BODY MASS INDEX: 23.77 KG/M2 | DIASTOLIC BLOOD PRESSURE: 76 MMHG

## 2023-03-20 DIAGNOSIS — O99.019 IRON DEFICIENCY ANEMIA DURING PREGNANCY: ICD-10-CM

## 2023-03-20 DIAGNOSIS — D50.9 MATERNAL IRON DEFICIENCY ANEMIA COMPLICATING PREGNANCY IN SECOND TRIMESTER: ICD-10-CM

## 2023-03-20 DIAGNOSIS — D50.9 IRON DEFICIENCY ANEMIA DURING PREGNANCY: ICD-10-CM

## 2023-03-20 DIAGNOSIS — Z3A.38 38 WEEKS GESTATION OF PREGNANCY: Primary | ICD-10-CM

## 2023-03-20 DIAGNOSIS — O99.012 MATERNAL IRON DEFICIENCY ANEMIA COMPLICATING PREGNANCY IN SECOND TRIMESTER: ICD-10-CM

## 2023-03-20 DIAGNOSIS — O99.113 IMMUNE THROMBOCYTOPENIA AFFECTING PREGNANCY IN THIRD TRIMESTER: ICD-10-CM

## 2023-03-20 DIAGNOSIS — E61.1 IRON DEFICIENCY: Primary | ICD-10-CM

## 2023-03-20 DIAGNOSIS — D69.3 IMMUNE THROMBOCYTOPENIA AFFECTING PREGNANCY IN THIRD TRIMESTER: ICD-10-CM

## 2023-03-20 PROCEDURE — 99999 PR PBB SHADOW E&M-EST. PATIENT-LVL II: ICD-10-PCS | Mod: PBBFAC,,, | Performed by: OBSTETRICS & GYNECOLOGY

## 2023-03-20 PROCEDURE — 63600175 PHARM REV CODE 636 W HCPCS: Performed by: INTERNAL MEDICINE

## 2023-03-20 PROCEDURE — 99213 OFFICE O/P EST LOW 20 MIN: CPT | Mod: TH,S$PBB,, | Performed by: OBSTETRICS & GYNECOLOGY

## 2023-03-20 PROCEDURE — 99212 OFFICE O/P EST SF 10 MIN: CPT | Mod: PBBFAC,TH,PO,25 | Performed by: OBSTETRICS & GYNECOLOGY

## 2023-03-20 PROCEDURE — 25000003 PHARM REV CODE 250: Performed by: INTERNAL MEDICINE

## 2023-03-20 PROCEDURE — 99999 PR PBB SHADOW E&M-EST. PATIENT-LVL II: CPT | Mod: PBBFAC,,, | Performed by: OBSTETRICS & GYNECOLOGY

## 2023-03-20 PROCEDURE — 96365 THER/PROPH/DIAG IV INF INIT: CPT

## 2023-03-20 PROCEDURE — 99213 PR OFFICE/OUTPT VISIT, EST, LEVL III, 20-29 MIN: ICD-10-PCS | Mod: TH,S$PBB,, | Performed by: OBSTETRICS & GYNECOLOGY

## 2023-03-20 RX ORDER — SODIUM CHLORIDE 0.9 % (FLUSH) 0.9 %
10 SYRINGE (ML) INJECTION
Status: DISCONTINUED | OUTPATIENT
Start: 2023-03-20 | End: 2023-03-20 | Stop reason: HOSPADM

## 2023-03-20 RX ADMIN — SODIUM CHLORIDE: 9 INJECTION, SOLUTION INTRAVENOUS at 11:03

## 2023-03-20 RX ADMIN — IRON SUCROSE 200 MG: 20 INJECTION, SOLUTION INTRAVENOUS at 12:03

## 2023-03-20 NOTE — PROGRESS NOTES
No c/o today   FHT normal   FH small  @ 38   1. IOB labs normal, CM enrolled, doing MT21, in complete, repeat set up with MFM; DM test done and 3rd trim labs ordered; consents done 1/3     2. ITP: seeing MFM and heme  -monthly CBC until 32 weeks then Q2 weeks until 36 weeks then weekly   - understands there is a change she may not be able to get epidural, saw ansthesia  -see MFM recommendations for medications/plt If needed  -heme is going to do steroids and IVIG to try to get plt count over 70k     3. Depression/anxiety: stable     4. Anemia: getting iron infusions with heme     5. Bilateral choroid plexus cysts: declines MT21  6. IUGR: twice weekly testing set up; delivery 38-39 weeks     7.breech: discussed choices for breech baby close to term. MF would not recommend ECV so set up  for 3/22 at 730     -going to do [facility] for peds     Face to Face time with patient: 20 minutes of total time spent on the encounter, which includes face to face time and non-face to face time preparing to see the patient (eg, review of tests), Obtaining and/or reviewing separately obtained history, Documenting clinical information in the electronic or other health record, Independently interpreting results (not separately reported) and communicating results to the patient/family/caregiver, or Care coordination (not separately reported).

## 2023-03-21 ENCOUNTER — HOSPITAL ENCOUNTER (INPATIENT)
Facility: HOSPITAL | Age: 23
LOS: 3 days | Discharge: HOME | End: 2023-03-24
Attending: OBSTETRICS & GYNECOLOGY | Admitting: OBSTETRICS & GYNECOLOGY
Payer: MEDICAID

## 2023-03-21 ENCOUNTER — ANESTHESIA EVENT (OUTPATIENT)
Dept: OBSTETRICS AND GYNECOLOGY | Facility: HOSPITAL | Age: 23
End: 2023-03-21
Payer: MEDICAID

## 2023-03-21 DIAGNOSIS — O36.5990 FETAL GROWTH RESTRICTION ANTEPARTUM: ICD-10-CM

## 2023-03-21 DIAGNOSIS — O14.90 PRE-ECLAMPSIA, ANTEPARTUM: ICD-10-CM

## 2023-03-21 DIAGNOSIS — D69.6 THROMBOCYTOPENIA: ICD-10-CM

## 2023-03-21 PROCEDURE — 11000001 HC ACUTE MED/SURG PRIVATE ROOM

## 2023-03-21 RX ORDER — TERBUTALINE SULFATE 1 MG/ML
INJECTION SUBCUTANEOUS
Status: DISPENSED
Start: 2023-03-21 | End: 2023-03-22

## 2023-03-21 RX ORDER — OXYTOCIN/RINGER'S LACTATE 30/500 ML
95 PLASTIC BAG, INJECTION (ML) INTRAVENOUS ONCE
Status: DISCONTINUED | OUTPATIENT
Start: 2023-03-22 | End: 2023-03-23

## 2023-03-21 RX ORDER — SODIUM CHLORIDE, SODIUM LACTATE, POTASSIUM CHLORIDE, CALCIUM CHLORIDE 600; 310; 30; 20 MG/100ML; MG/100ML; MG/100ML; MG/100ML
INJECTION, SOLUTION INTRAVENOUS CONTINUOUS
Status: DISCONTINUED | OUTPATIENT
Start: 2023-03-22 | End: 2023-03-23

## 2023-03-21 RX ORDER — MISOPROSTOL 200 UG/1
800 TABLET ORAL
Status: DISCONTINUED | OUTPATIENT
Start: 2023-03-21 | End: 2023-03-23

## 2023-03-21 RX ORDER — FAMOTIDINE 10 MG/ML
20 INJECTION INTRAVENOUS
Status: DISCONTINUED | OUTPATIENT
Start: 2023-03-21 | End: 2023-03-23

## 2023-03-21 RX ORDER — MUPIROCIN 20 MG/G
OINTMENT TOPICAL
Status: CANCELLED | OUTPATIENT
Start: 2023-03-21

## 2023-03-21 RX ORDER — CARBOPROST TROMETHAMINE 250 UG/ML
250 INJECTION, SOLUTION INTRAMUSCULAR
Status: DISCONTINUED | OUTPATIENT
Start: 2023-03-21 | End: 2023-03-23

## 2023-03-21 RX ORDER — SODIUM CITRATE AND CITRIC ACID MONOHYDRATE 334; 500 MG/5ML; MG/5ML
30 SOLUTION ORAL
Status: DISCONTINUED | OUTPATIENT
Start: 2023-03-21 | End: 2023-03-23

## 2023-03-21 RX ORDER — METHYLERGONOVINE MALEATE 0.2 MG/ML
200 INJECTION INTRAVENOUS
Status: DISCONTINUED | OUTPATIENT
Start: 2023-03-21 | End: 2023-03-23

## 2023-03-21 RX ORDER — OXYTOCIN/RINGER'S LACTATE 30/500 ML
334 PLASTIC BAG, INJECTION (ML) INTRAVENOUS ONCE
Status: COMPLETED | OUTPATIENT
Start: 2023-03-22 | End: 2023-03-22

## 2023-03-21 RX ORDER — CEFAZOLIN SODIUM 2 G/50ML
2 SOLUTION INTRAVENOUS ONCE
Status: COMPLETED | OUTPATIENT
Start: 2023-03-22 | End: 2023-03-22

## 2023-03-21 RX ORDER — TERBUTALINE SULFATE 1 MG/ML
0.25 INJECTION SUBCUTANEOUS ONCE
Status: DISCONTINUED | OUTPATIENT
Start: 2023-03-22 | End: 2023-03-22

## 2023-03-21 RX ADMIN — SODIUM CHLORIDE, SODIUM LACTATE, POTASSIUM CHLORIDE, AND CALCIUM CHLORIDE 1000 ML: .6; .31; .03; .02 INJECTION, SOLUTION INTRAVENOUS at 11:03

## 2023-03-22 ENCOUNTER — ANESTHESIA (OUTPATIENT)
Dept: OBSTETRICS AND GYNECOLOGY | Facility: HOSPITAL | Age: 23
End: 2023-03-22
Payer: MEDICAID

## 2023-03-22 PROBLEM — O36.5990 FETAL GROWTH RESTRICTION ANTEPARTUM: Status: ACTIVE | Noted: 2023-03-22

## 2023-03-22 PROBLEM — O14.90 PRE-ECLAMPSIA: Status: ACTIVE | Noted: 2023-03-22

## 2023-03-22 LAB
ABO + RH BLD: NORMAL
ALLENS TEST: ABNORMAL
ALLENS TEST: ABNORMAL
BASOPHILS # BLD AUTO: 0.03 K/UL (ref 0–0.2)
BASOPHILS # BLD AUTO: 0.04 K/UL (ref 0–0.2)
BASOPHILS NFR BLD: 0.3 % (ref 0–1.9)
BASOPHILS NFR BLD: 0.3 % (ref 0–1.9)
BLD GP AB SCN CELLS X3 SERPL QL: NORMAL
CREAT UR-MCNC: 14.9 MG/DL (ref 15–325)
DELSYS: ABNORMAL
DELSYS: ABNORMAL
DIFFERENTIAL METHOD: ABNORMAL
DIFFERENTIAL METHOD: ABNORMAL
EOSINOPHIL # BLD AUTO: 0 K/UL (ref 0–0.5)
EOSINOPHIL # BLD AUTO: 0.1 K/UL (ref 0–0.5)
EOSINOPHIL NFR BLD: 0.4 % (ref 0–8)
EOSINOPHIL NFR BLD: 0.5 % (ref 0–8)
ERYTHROCYTE [DISTWIDTH] IN BLOOD BY AUTOMATED COUNT: 13.4 % (ref 11.5–14.5)
ERYTHROCYTE [DISTWIDTH] IN BLOOD BY AUTOMATED COUNT: 13.8 % (ref 11.5–14.5)
HCO3 UR-SCNC: 20.6 MMOL/L (ref 24–28)
HCO3 UR-SCNC: 23.4 MMOL/L (ref 24–28)
HCT VFR BLD AUTO: 32.2 % (ref 37–48.5)
HCT VFR BLD AUTO: 35.7 % (ref 37–48.5)
HGB BLD-MCNC: 11.3 G/DL (ref 12–16)
HGB BLD-MCNC: 12.5 G/DL (ref 12–16)
IMM GRANULOCYTES # BLD AUTO: 0.11 K/UL (ref 0–0.04)
IMM GRANULOCYTES # BLD AUTO: 0.24 K/UL (ref 0–0.04)
IMM GRANULOCYTES NFR BLD AUTO: 0.9 % (ref 0–0.5)
IMM GRANULOCYTES NFR BLD AUTO: 2.4 % (ref 0–0.5)
LYMPHOCYTES # BLD AUTO: 2 K/UL (ref 1–4.8)
LYMPHOCYTES # BLD AUTO: 2.3 K/UL (ref 1–4.8)
LYMPHOCYTES NFR BLD: 18.2 % (ref 18–48)
LYMPHOCYTES NFR BLD: 19.6 % (ref 18–48)
MCH RBC QN AUTO: 30.1 PG (ref 27–31)
MCH RBC QN AUTO: 30.4 PG (ref 27–31)
MCHC RBC AUTO-ENTMCNC: 35 G/DL (ref 32–36)
MCHC RBC AUTO-ENTMCNC: 35.1 G/DL (ref 32–36)
MCV RBC AUTO: 86 FL (ref 82–98)
MCV RBC AUTO: 87 FL (ref 82–98)
MONOCYTES # BLD AUTO: 0.5 K/UL (ref 0.3–1)
MONOCYTES # BLD AUTO: 0.7 K/UL (ref 0.3–1)
MONOCYTES NFR BLD: 3.9 % (ref 4–15)
MONOCYTES NFR BLD: 6.4 % (ref 4–15)
NEUTROPHILS # BLD AUTO: 7.2 K/UL (ref 1.8–7.7)
NEUTROPHILS # BLD AUTO: 9.7 K/UL (ref 1.8–7.7)
NEUTROPHILS NFR BLD: 70.9 % (ref 38–73)
NEUTROPHILS NFR BLD: 76.2 % (ref 38–73)
NRBC BLD-RTO: 0 /100 WBC
NRBC BLD-RTO: 0 /100 WBC
PCO2 BLDA: 39.4 MMHG (ref 35–45)
PCO2 BLDA: 48.2 MMHG (ref 35–45)
PH SMN: 7.29 [PH] (ref 7.35–7.45)
PH SMN: 7.33 [PH] (ref 7.35–7.45)
PLATELET # BLD AUTO: 86 K/UL (ref 150–450)
PLATELET # BLD AUTO: 94 K/UL (ref 150–450)
PMV BLD AUTO: 12.1 FL (ref 9.2–12.9)
PMV BLD AUTO: 12.3 FL (ref 9.2–12.9)
PO2 BLDA: 16 MMHG (ref 80–100)
PO2 BLDA: 23 MMHG (ref 80–100)
POC BE: -3 MMOL/L
POC BE: -5 MMOL/L
POC SATURATED O2: 20 % (ref 95–100)
POC SATURATED O2: 34 % (ref 95–100)
POC TCO2: 22 MMOL/L (ref 23–27)
POC TCO2: 25 MMOL/L (ref 23–27)
PROT UR-MCNC: 82 MG/DL (ref 0–15)
PROT/CREAT UR: 5.5 MG/G{CREAT} (ref 0–0.2)
RBC # BLD AUTO: 3.76 M/UL (ref 4–5.4)
RBC # BLD AUTO: 4.11 M/UL (ref 4–5.4)
RPR SER QL: NORMAL
SAMPLE: ABNORMAL
SAMPLE: ABNORMAL
SITE: ABNORMAL
SITE: ABNORMAL
WBC # BLD AUTO: 10.14 K/UL (ref 3.9–12.7)
WBC # BLD AUTO: 12.66 K/UL (ref 3.9–12.7)

## 2023-03-22 PROCEDURE — 63600175 PHARM REV CODE 636 W HCPCS: Performed by: NURSE ANESTHETIST, CERTIFIED REGISTERED

## 2023-03-22 PROCEDURE — 88307 PR  SURG PATH,LEVEL V: ICD-10-PCS | Mod: 26,,, | Performed by: PATHOLOGY

## 2023-03-22 PROCEDURE — 99900035 HC TECH TIME PER 15 MIN (STAT)

## 2023-03-22 PROCEDURE — 59514 PR CESAREAN DELIVERY ONLY: ICD-10-PCS | Mod: AT,,, | Performed by: OBSTETRICS & GYNECOLOGY

## 2023-03-22 PROCEDURE — 63600175 PHARM REV CODE 636 W HCPCS: Performed by: OBSTETRICS & GYNECOLOGY

## 2023-03-22 PROCEDURE — 25000003 PHARM REV CODE 250: Performed by: OBSTETRICS & GYNECOLOGY

## 2023-03-22 PROCEDURE — 86592 SYPHILIS TEST NON-TREP QUAL: CPT | Performed by: OBSTETRICS & GYNECOLOGY

## 2023-03-22 PROCEDURE — 37000008 HC ANESTHESIA 1ST 15 MINUTES: Performed by: OBSTETRICS & GYNECOLOGY

## 2023-03-22 PROCEDURE — 59514 CESAREAN DELIVERY ONLY: CPT | Mod: AT,,, | Performed by: OBSTETRICS & GYNECOLOGY

## 2023-03-22 PROCEDURE — 37000009 HC ANESTHESIA EA ADD 15 MINS: Performed by: OBSTETRICS & GYNECOLOGY

## 2023-03-22 PROCEDURE — 84156 ASSAY OF PROTEIN URINE: CPT | Performed by: OBSTETRICS & GYNECOLOGY

## 2023-03-22 PROCEDURE — 85025 COMPLETE CBC W/AUTO DIFF WBC: CPT | Mod: 91 | Performed by: OBSTETRICS & GYNECOLOGY

## 2023-03-22 PROCEDURE — 71000039 HC RECOVERY, EACH ADD'L HOUR: Performed by: OBSTETRICS & GYNECOLOGY

## 2023-03-22 PROCEDURE — 88307 TISSUE EXAM BY PATHOLOGIST: CPT | Mod: 26,,, | Performed by: PATHOLOGY

## 2023-03-22 PROCEDURE — 36004724 HC OB OR TIME LEV III - 1ST 15 MIN: Performed by: OBSTETRICS & GYNECOLOGY

## 2023-03-22 PROCEDURE — 88307 TISSUE EXAM BY PATHOLOGIST: CPT | Performed by: PATHOLOGY

## 2023-03-22 PROCEDURE — 59514 CESAREAN DELIVERY ONLY: CPT | Mod: QY,ANES,, | Performed by: ANESTHESIOLOGY

## 2023-03-22 PROCEDURE — 59514 PRA REAN DELIVERY ONLY: ICD-10-PCS | Mod: QY,ANES,, | Performed by: ANESTHESIOLOGY

## 2023-03-22 PROCEDURE — 86900 BLOOD TYPING SEROLOGIC ABO: CPT | Performed by: OBSTETRICS & GYNECOLOGY

## 2023-03-22 PROCEDURE — 27200688 HC TRAY, SPINAL-HYPER/ ISOBARIC: Performed by: ANESTHESIOLOGY

## 2023-03-22 PROCEDURE — 59514 PRA REAN DELIVERY ONLY: ICD-10-PCS | Mod: QX,CRNA,, | Performed by: NURSE ANESTHETIST, CERTIFIED REGISTERED

## 2023-03-22 PROCEDURE — 11000001 HC ACUTE MED/SURG PRIVATE ROOM

## 2023-03-22 PROCEDURE — 25000003 PHARM REV CODE 250: Performed by: NURSE ANESTHETIST, CERTIFIED REGISTERED

## 2023-03-22 PROCEDURE — 82803 BLOOD GASES ANY COMBINATION: CPT

## 2023-03-22 PROCEDURE — 36415 COLL VENOUS BLD VENIPUNCTURE: CPT | Performed by: OBSTETRICS & GYNECOLOGY

## 2023-03-22 PROCEDURE — 59514 CESAREAN DELIVERY ONLY: CPT | Mod: QX,CRNA,, | Performed by: NURSE ANESTHETIST, CERTIFIED REGISTERED

## 2023-03-22 PROCEDURE — 71000033 HC RECOVERY, INTIAL HOUR: Performed by: OBSTETRICS & GYNECOLOGY

## 2023-03-22 PROCEDURE — 36004725 HC OB OR TIME LEV III - EA ADD 15 MIN: Performed by: OBSTETRICS & GYNECOLOGY

## 2023-03-22 RX ORDER — DIPHENHYDRAMINE HCL 25 MG
25 CAPSULE ORAL EVERY 4 HOURS PRN
Status: DISCONTINUED | OUTPATIENT
Start: 2023-03-22 | End: 2023-03-24 | Stop reason: HOSPADM

## 2023-03-22 RX ORDER — SIMETHICONE 80 MG
1 TABLET,CHEWABLE ORAL EVERY 6 HOURS PRN
Status: DISCONTINUED | OUTPATIENT
Start: 2023-03-22 | End: 2023-03-24 | Stop reason: HOSPADM

## 2023-03-22 RX ORDER — ONDANSETRON 2 MG/ML
INJECTION INTRAMUSCULAR; INTRAVENOUS
Status: DISCONTINUED | OUTPATIENT
Start: 2023-03-22 | End: 2023-03-22

## 2023-03-22 RX ORDER — IBUPROFEN 400 MG/1
800 TABLET ORAL EVERY 8 HOURS PRN
Status: DISCONTINUED | OUTPATIENT
Start: 2023-03-22 | End: 2023-03-24 | Stop reason: HOSPADM

## 2023-03-22 RX ORDER — ADHESIVE BANDAGE
30 BANDAGE TOPICAL 2 TIMES DAILY PRN
Status: DISCONTINUED | OUTPATIENT
Start: 2023-03-23 | End: 2023-03-24 | Stop reason: HOSPADM

## 2023-03-22 RX ORDER — MORPHINE SULFATE 1 MG/ML
INJECTION, SOLUTION EPIDURAL; INTRATHECAL; INTRAVENOUS
Status: DISCONTINUED | OUTPATIENT
Start: 2023-03-22 | End: 2023-03-22

## 2023-03-22 RX ORDER — DOCUSATE SODIUM 100 MG/1
200 CAPSULE, LIQUID FILLED ORAL 2 TIMES DAILY
Status: DISCONTINUED | OUTPATIENT
Start: 2023-03-22 | End: 2023-03-24 | Stop reason: HOSPADM

## 2023-03-22 RX ORDER — BISACODYL 10 MG
10 SUPPOSITORY, RECTAL RECTAL ONCE AS NEEDED
Status: DISCONTINUED | OUTPATIENT
Start: 2023-03-22 | End: 2023-03-24 | Stop reason: HOSPADM

## 2023-03-22 RX ORDER — MAGNESIUM SULFATE HEPTAHYDRATE 40 MG/ML
INJECTION, SOLUTION INTRAVENOUS
Status: DISCONTINUED
Start: 2023-03-22 | End: 2023-03-22 | Stop reason: WASHOUT

## 2023-03-22 RX ORDER — PHENYLEPHRINE HYDROCHLORIDE 10 MG/ML
INJECTION INTRAVENOUS
Status: DISCONTINUED | OUTPATIENT
Start: 2023-03-22 | End: 2023-03-22

## 2023-03-22 RX ORDER — PROCHLORPERAZINE EDISYLATE 5 MG/ML
5 INJECTION INTRAMUSCULAR; INTRAVENOUS EVERY 6 HOURS PRN
Status: DISCONTINUED | OUTPATIENT
Start: 2023-03-22 | End: 2023-03-23

## 2023-03-22 RX ORDER — NIFEDIPINE 30 MG/1
30 TABLET, EXTENDED RELEASE ORAL DAILY
Status: DISCONTINUED | OUTPATIENT
Start: 2023-03-22 | End: 2023-03-23

## 2023-03-22 RX ORDER — ONDANSETRON 2 MG/ML
4 INJECTION INTRAMUSCULAR; INTRAVENOUS EVERY 6 HOURS PRN
Status: ACTIVE | OUTPATIENT
Start: 2023-03-22 | End: 2023-03-23

## 2023-03-22 RX ORDER — OXYTOCIN/RINGER'S LACTATE 30/500 ML
95 PLASTIC BAG, INJECTION (ML) INTRAVENOUS ONCE
Status: DISCONTINUED | OUTPATIENT
Start: 2023-03-22 | End: 2023-03-24 | Stop reason: HOSPADM

## 2023-03-22 RX ORDER — ACETAMINOPHEN 325 MG/1
650 TABLET ORAL EVERY 6 HOURS
Status: ACTIVE | OUTPATIENT
Start: 2023-03-22 | End: 2023-03-23

## 2023-03-22 RX ORDER — MUPIROCIN 20 MG/G
OINTMENT TOPICAL 2 TIMES DAILY
Status: DISCONTINUED | OUTPATIENT
Start: 2023-03-22 | End: 2023-03-24 | Stop reason: HOSPADM

## 2023-03-22 RX ORDER — OXYCODONE AND ACETAMINOPHEN 10; 325 MG/1; MG/1
1 TABLET ORAL EVERY 4 HOURS PRN
Status: DISCONTINUED | OUTPATIENT
Start: 2023-03-22 | End: 2023-03-24 | Stop reason: HOSPADM

## 2023-03-22 RX ORDER — OXYCODONE HYDROCHLORIDE 5 MG/1
5 TABLET ORAL EVERY 4 HOURS PRN
Status: DISCONTINUED | OUTPATIENT
Start: 2023-03-22 | End: 2023-03-22

## 2023-03-22 RX ORDER — BUPIVACAINE HYDROCHLORIDE 7.5 MG/ML
INJECTION, SOLUTION INTRASPINAL
Status: DISCONTINUED | OUTPATIENT
Start: 2023-03-22 | End: 2023-03-22

## 2023-03-22 RX ORDER — OXYCODONE HYDROCHLORIDE 5 MG/1
10 TABLET ORAL EVERY 4 HOURS PRN
Status: DISCONTINUED | OUTPATIENT
Start: 2023-03-22 | End: 2023-03-22

## 2023-03-22 RX ORDER — KETOROLAC TROMETHAMINE 30 MG/ML
INJECTION, SOLUTION INTRAMUSCULAR; INTRAVENOUS
Status: DISCONTINUED | OUTPATIENT
Start: 2023-03-22 | End: 2023-03-22

## 2023-03-22 RX ORDER — AMOXICILLIN 250 MG
1 CAPSULE ORAL NIGHTLY PRN
Status: DISCONTINUED | OUTPATIENT
Start: 2023-03-22 | End: 2023-03-24 | Stop reason: HOSPADM

## 2023-03-22 RX ORDER — IBUPROFEN 400 MG/1
800 TABLET ORAL EVERY 8 HOURS
Status: DISCONTINUED | OUTPATIENT
Start: 2023-03-23 | End: 2023-03-22

## 2023-03-22 RX ORDER — OXYCODONE AND ACETAMINOPHEN 5; 325 MG/1; MG/1
1 TABLET ORAL EVERY 4 HOURS PRN
Status: DISCONTINUED | OUTPATIENT
Start: 2023-03-22 | End: 2023-03-24 | Stop reason: HOSPADM

## 2023-03-22 RX ORDER — KETOROLAC TROMETHAMINE 30 MG/ML
30 INJECTION, SOLUTION INTRAMUSCULAR; INTRAVENOUS EVERY 8 HOURS
Status: DISCONTINUED | OUTPATIENT
Start: 2023-03-22 | End: 2023-03-22

## 2023-03-22 RX ORDER — PRENATAL WITH FERROUS FUM AND FOLIC ACID 3080; 920; 120; 400; 22; 1.84; 3; 20; 10; 1; 12; 200; 27; 25; 2 [IU]/1; [IU]/1; MG/1; [IU]/1; MG/1; MG/1; MG/1; MG/1; MG/1; MG/1; UG/1; MG/1; MG/1; MG/1; MG/1
1 TABLET ORAL DAILY
Status: DISCONTINUED | OUTPATIENT
Start: 2023-03-22 | End: 2023-03-24 | Stop reason: HOSPADM

## 2023-03-22 RX ORDER — DEXMEDETOMIDINE HYDROCHLORIDE 100 UG/ML
INJECTION, SOLUTION INTRAVENOUS
Status: DISCONTINUED | OUTPATIENT
Start: 2023-03-22 | End: 2023-03-22

## 2023-03-22 RX ADMIN — Medication 334 ML/HR: at 12:03

## 2023-03-22 RX ADMIN — DOCUSATE SODIUM 200 MG: 100 CAPSULE, LIQUID FILLED ORAL at 08:03

## 2023-03-22 RX ADMIN — OXYCODONE AND ACETAMINOPHEN 1 TABLET: 10; 325 TABLET ORAL at 07:03

## 2023-03-22 RX ADMIN — PHENYLEPHRINE HYDROCHLORIDE 100 MCG: 10 INJECTION INTRAVENOUS at 12:03

## 2023-03-22 RX ADMIN — MORPHINE SULFATE 4.9 MG: 1 INJECTION, SOLUTION EPIDURAL; INTRATHECAL; INTRAVENOUS at 12:03

## 2023-03-22 RX ADMIN — BUPIVACAINE HYDROCHLORIDE 1.8 ML: 7.5 INJECTION, SOLUTION SUBARACHNOID at 12:03

## 2023-03-22 RX ADMIN — CEFAZOLIN SODIUM 2 G: 2 SOLUTION INTRAVENOUS at 12:03

## 2023-03-22 RX ADMIN — MUPIROCIN: 20 OINTMENT TOPICAL at 08:03

## 2023-03-22 RX ADMIN — SODIUM CHLORIDE, SODIUM LACTATE, POTASSIUM CHLORIDE, AND CALCIUM CHLORIDE: .6; .31; .03; .02 INJECTION, SOLUTION INTRAVENOUS at 12:03

## 2023-03-22 RX ADMIN — OXYCODONE AND ACETAMINOPHEN 1 TABLET: 10; 325 TABLET ORAL at 04:03

## 2023-03-22 RX ADMIN — MORPHINE SULFATE 0.1 MG: 1 INJECTION, SOLUTION EPIDURAL; INTRATHECAL; INTRAVENOUS at 12:03

## 2023-03-22 RX ADMIN — DEXMEDETOMIDINE HCL 5 MCG: 100 INJECTION INTRAVENOUS at 12:03

## 2023-03-22 RX ADMIN — OXYCODONE HYDROCHLORIDE AND ACETAMINOPHEN 1 TABLET: 5; 325 TABLET ORAL at 08:03

## 2023-03-22 RX ADMIN — IBUPROFEN 800 MG: 400 TABLET ORAL at 04:03

## 2023-03-22 RX ADMIN — ONDANSETRON 4 MG: 2 INJECTION, SOLUTION INTRAMUSCULAR; INTRAVENOUS at 12:03

## 2023-03-22 RX ADMIN — NIFEDIPINE 30 MG: 30 TABLET, FILM COATED, EXTENDED RELEASE ORAL at 02:03

## 2023-03-22 RX ADMIN — PRENATAL VIT W/ FE FUMARATE-FA TAB 27-0.8 MG 1 TABLET: 27-0.8 TAB at 08:03

## 2023-03-22 RX ADMIN — KETOROLAC TROMETHAMINE 30 MG: 30 INJECTION, SOLUTION INTRAMUSCULAR; INTRAVENOUS at 01:03

## 2023-03-22 RX ADMIN — FAMOTIDINE 20 MG: 10 INJECTION INTRAVENOUS at 12:03

## 2023-03-22 RX ADMIN — PHENYLEPHRINE HYDROCHLORIDE 100 MCG: 10 INJECTION INTRAVENOUS at 01:03

## 2023-03-22 NOTE — H&P
[facility] - [facility]  Obstetrics  History & Physical    Patient Name: Tamica Ware  MRN: [id number]  Admission Date: 3/21/2023  Primary Care Provider: Primary Doctor No    Subjective:     Principal Problem:Fetal growth restriction antepartum    History of Present Illness:  Tamica Ware is a 22 y.o.  female with IUP at 38w4d weeks gestation by 12w4d US who presented to triage with complaint of contraction . This IUP is complicated by FGR in 12% and AC of 2% at 35 weeks, breech presentation, ITP (most recent plt count of 94), and anemia. Patient reports contractions, denies vaginal bleeding, denies LOF.   Fetal Movement: normal.     OB History    Para Term  AB Living   1 0 0 0 0 0   SAB IAB Ectopic Multiple Live Births   0 0 0 0 0      # Outcome Date GA Lbr Jg/2nd Weight Sex Delivery Anes PTL Lv   1 Current              Past Medical History:   Diagnosis Date    Suicidal ideations     Thrombocytopenia     Urinary tract infection      Past Surgical History:   Procedure Laterality Date    TONSILLECTOMY         PTA Medications   Medication Sig    ferrous sulfate (FEOSOL) 325 mg (65 mg iron) Tab tablet Take 1 tablet (325 mg total) by mouth once daily.    ondansetron (ZOFRAN-ODT) 4 MG TbDL Take 1 tablet (4 mg total) by mouth every 8 (eight) hours as needed.    PNV cmb 52/iron/FA/omega-3/dha (COMPLETE  DHA ORAL) Take by mouth.       Review of patient's allergies indicates:  No Known Allergies     Family History    None       Tobacco Use    Smoking status: Former     Types: Vaping with nicotine     Start date: 2019    Smokeless tobacco: Never   Substance and Sexual Activity    Alcohol use: Never    Drug use: Never    Sexual activity: Yes     Partners: Male     Review of Systems   Constitutional: Negative.    HENT: Negative.     Eyes: Negative.    Respiratory:  Negative for cough and shortness of breath.    Cardiovascular:  Negative for chest pain.   Gastrointestinal:  Negative for blood in  stool, diarrhea, nausea and vomiting.   Endocrine: Negative.    Integumentary:  Negative.   Neurological:  Negative for syncope, numbness and headaches.   Psychiatric/Behavioral: Negative.      Objective:     Vital Signs (Most Recent):  Pulse: (!) 57 (23)  BP: (!) 140/78 (230)  SpO2: 97 % (23)   Vital Signs (24h Range):  Pulse:  [57-67] 57  SpO2:  [97 %-98 %] 97 %  BP: (140)/(78) 140/78        There is no height or weight on file to calculate BMI.    FHT: 130, mod BTBV, + repetitve late decels. Resolving s/p 1 dose of terbutaline.   TOCO:  Q 3 minutes    Physical Exam:   Constitutional: She is oriented to person, place, and time. She appears well-developed and well-nourished. No distress.    HENT:   Head: Normocephalic and atraumatic.       Pulmonary/Chest: Effort normal.        Abdominal: Soft.             Musculoskeletal: Moves all extremeties. No edema.       Neurological: She is alert and oriented to person, place, and time.    Skin: Skin is warm and dry.    Psychiatric: She has a normal mood and affect.     Cervix:  70/-3 per nursing       Significant Labs:  Lab Results   Component Value Date    GROUPTRH B POS 2023    HEPBSAG Non-reactive 2022    STREPBCULT No Group B Streptococcus isolated 2023       CBC:   Recent Labs   Lab 23  0003   WBC 10.14   RBC 4.11   HGB 12.5   HCT 35.7*   PLT 94*   MCV 87   MCH 30.4   MCHC 35.0         Assessment/Plan:     22 y.o. 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

## 2023-03-22 NOTE — L&D DELIVERY NOTE
[facility] - Labor & Delivery   Section   Operative Note    SUMMARY     Date of Procedure:  2023        Procedure:  Primary Low Transverse  Delivery via Pfannenstiel skin incision      Indications: malpresentation: lyndsey breech and non-reassuring fetal status            Pre-operative Diagnosis:  1. IUP at 38 week 4 day pregnancy  2. Lyndsey breech presentation  3. Fetal growth restriction  4. ITP  5. Iron deficiency anemia  6. Non reassuring fetal status    Post-operative Diagnosis: same    Surgeon: Rosa Noble MD    Assistant: Dominga Cope    Anesthesia:Spinal anesthesia    Findings:  viable female infant, apgars 8/9. Normal uterus, tubes, and ovaries    Estimated Blood Loss:  700 ml           Total IV Fluids: 1600 ml    Urine Output: 300 ml     Specimens: placenta, cord gases                  Complications:   None; patient tolerated the procedure well.               Condition: stable    Procedure Details:  The risks, benefits, complications, treatment options, and expected outcomes were discussed with the patient.  The patient concurred with the proposed plan, giving informed consent. The patient was taken to Operating Room and a time out was held.    After induction of anesthesia, the patient was prepped and draped in the usual sterile manner. A Pfannenstiel incision was made and carried down through the subcutaneous tissue to the fascia. Fascial incision was made and extended transversely. The fascia was grasped with Kocher clamps and  from the underlying rectus tissue superiorly and inferiorly. The peritoneum was identified, found to be free of adherent bowel and entered bluntly. Peritoneal incision was extended longitudinally. The vesico-uterine peritoneum was identified and bladder blade was inserted. The vesico-uterine peritoneum was incised transversely and the bladder flap was bluntly freed from the lower uterine segment. The bladder blade was reinserted to keep the bladder  out of the operative field. A low transverse uterine incision was made with knife. Infant was noted to be in lyndsey breech position. The buttock was brought to the incision and elevated out of the pelvis. The hips were then delivered atraumatically and a Pinaud maneuver was performed. Next delivery of the arms without difficulty with the Loveset maneuver then using Mauriceau to deliver the head.  The patient delivered a single viable female infant without difficulty.  Infant weighed 5lb 14.9oz grams with Apgar scores of 8/9 at one and five minutes respectively. After the umbilical cord was clamped and cut cord blood was obtained for evaluation. The placenta was removed intact and appeared normal. The uterus was exteriorized and cleared of any remaining placenta membranes or tissue. The uterine outline, tubes and ovaries appeared normal. The uterine incision was closed with running locked sutures of 0 chromic. Hemostasis was observed. The uterus was returned to the abdominal cavity. Incision was reinspected and good hemostasis was noted. The abdominal cavity was irrigated to remove clots. The peritoneum was reapproximated with 2-0 Vicryl running. The muscle was reapproximated with 0 chromic gut in mattress fashion. The fascia was then reapproximated with running sutures of 0 Vicryl x 2 after injection 30ml of 0.25% bupivacaine subfascially. The subcutaneous tissue irrigated and reapproximated with 2-0 plain gut. The skin was reapproximated with 4-0 Monocryl and the incision dressed with Aquacel. Instrument, sponge, and needle counts were correct prior the abdominal closure and at the conclusion of the case.     Rosa Noble MD  OB/GYN                 Specimens:   Specimen (24h ago, onward)      None            Condition: Good    Disposition: PACU - hemodynamically stable.    Attestation: Good         Delivery Information for Girl Tamica Ware    Birth information:  YOB: 2021   Time of birth: 12:38  "AM   Sex: female   Head Delivery Date/Time: 3/22/2023 12:38 AM   Delivery type: , Low Transverse   Gestational Age: 38w4d  Unknown    Delivery Providers    Delivering clinician:            Measurements    Weight: 2690 g  Weight (lbs): 5 lb 14.9 oz  Length: 45.7 cm  Length (in): 18"         Apgars    Living status: Living  Apgars:  1 min.:  5 min.:  10 min.:  15 min.:  20 min.:    Skin color:  1  1       Heart rate:  2  2       Reflex irritability:  2  2       Muscle tone:  1  2       Respiratory effort:  2  2       Total:  8  9                Operative Delivery    Forceps attempted?: No  Vacuum extractor attempted?: No         Shoulder Dystocia    Shoulder dystocia present?: No           Presentation    Position: Middle Sacrum Anterior           Interventions/Resuscitation    Method: Bulb Suctioning, Tactile Stimulation, Blow By Oxygen       Cord    Vessels: 3 vessels  Complications: None  Delayed Cord Clamping?: No  Cord Blood Disposition: Lab, Sent with Baby  Gases Sent?: Yes  Stem Cell Collection (by MD): No       Placenta    Placenta delivery date/time:   Placenta removal: Manual removal  Placenta appearance: Intact  Placenta disposition: Discarded           Labor Events:       labor:       Labor Onset Date/Time:         Dilation Complete Date/Time:         Start Pushing Date/Time:         Start Pushing Date/Time:       Rupture Date/Time:            Rupture type:            Fluid Amount:         Fluid Color:                  steroids:       Antibiotics given for GBS:       Induction:       Indications for induction:        Augmentation:       Indications for augmentation:       Labor complications:       Additional complications:          Cervical ripening:                     Delivery:      Episiotomy:       Indication for Episiotomy:       Perineal Lacerations:   Repaired:      Periurethral Laceration:   Repaired:     Labial Laceration:   Repaired:     Sulcus Laceration:   Repaired:   "   Vaginal Laceration:   Repaired:     Cervical Laceration:   Repaired:     Repair suture:       Repair # of packets:       Last Value - EBL - Nursing (mL):       Sum - EBL - Nursing (mL): 0     Last Value - EBL - Anesthesia (mL):        Calculated QBL (mL):         Vaginal Sweep Performed:       Surgicount Correct:       Vaginal Packing:   Quantity:       Other providers:       Anesthesia    Method: Spinal          Details (if applicable):  Trial of Labor      Categorization:      Priority:     Indications for :     Incision Type:       Additional  information:  Forceps:    Vacuum:    Breech:    Observed anomalies    Other (Comments):           Rosa Noble MD, FACOG  OB/GYN

## 2023-03-22 NOTE — PLAN OF CARE
POC reviewed with pt around 0815; verbalized acceptance and understanding.  Pt's VS stable.  Remains free from falls and injury.  Pain controlled with ordered meds.  Voiding spontaneously without difficulty post rod catheter removal.  Bonding well with baby.  Exclusively breastfeeding.  Baby tolerating feedings; voiding/stooling appropriately.  Family at bedside to offer support.     PC ratio collected for increased blood pressures - results pending.

## 2023-03-22 NOTE — NURSING
To 359 for recovery. Aaox3. No c/o, nad. Scd's on, rod intact. Pit on pump at 95cc/hr. Lt abd drsg c/d/I. Resp even, unlabored, vsx stable.

## 2023-03-22 NOTE — TRANSFER OF CARE
Anesthesia Transfer of Care Note    Patient: Tamica E Chaz    Procedure(s) Performed: Procedure(s) (LRB):   SECTION (N/A)    Patient location: Labor and Delivery    Anesthesia Type: spinal    Transport from OR: Transported from OR on room air with adequate spontaneous ventilation    Post pain: adequate analgesia    Post assessment: no apparent anesthetic complications and tolerated procedure well    Post vital signs: stable    Level of consciousness: awake, alert and oriented    Nausea/Vomiting: no nausea/vomiting    Complications: none    Transfer of care protocol was followed      Last vitals:   Visit Vitals  BP (!) 140/78   Pulse (!) 57   LMP 2022   SpO2 97%

## 2023-03-22 NOTE — ANESTHESIA PREPROCEDURE EVALUATION
03/22/2023  Tamica Ware is a 22 y.o., female.      Pre-op Assessment    I have reviewed the Patient Summary Reports.     I have reviewed the Nursing Notes. I have reviewed the NPO Status.   I have reviewed the Medications.     Review of Systems  Anesthesia Hx:  No problems with previous Anesthesia  History of prior surgery of interest to airway management or planning: Previous anesthesia: MAC Denies Family Hx of Anesthesia complications.   Denies Personal Hx of Anesthesia complications.   Social:  Non-Smoker, No Alcohol Use    Hematology/Oncology:  Hematology Normal   Oncology Normal     EENT/Dental:EENT/Dental Normal   Cardiovascular:  Cardiovascular Normal Exercise tolerance: good  Denies Pacemaker.  Denies Hypertension.  NYHA Classification I ECG has been reviewed.    Pulmonary:  Pulmonary Normal    Renal/:  Renal/ Normal     Hepatic/GI:  Hepatic/GI Normal    Musculoskeletal:  Musculoskeletal Normal    Neurological:  Neurology Normal    Endocrine:  Endocrine Normal    Psych:   Psychiatric History          Physical Exam  General: Well nourished, Cooperative, Alert and Oriented    Airway:  Mallampati: II   Mouth Opening: Normal  TM Distance: Normal  Tongue: Normal  Neck ROM: Normal ROM    Dental:  Intact        Anesthesia Plan  Type of Anesthesia, risks & benefits discussed:    Anesthesia Type: Spinal, CSE, Gen ETT  Intra-op Monitoring Plan: Standard ASA Monitors  Post Op Pain Control Plan: intrathecal opioid  Informed Consent: Informed consent signed with the Patient and all parties understand the risks and agree with anesthesia plan.  All questions answered.   ASA Score: 2 Emergent  Day of Surgery Review of History & Physical: I have interviewed and examined the patient. I have reviewed the patient's H&P dated:     Ready For Surgery From Anesthesia Perspective.     .

## 2023-03-22 NOTE — NURSING
Repetitive lates. Dr chino notified. Ivf bolus in progress. Nsy, anesthesia, first assist notified. Terb 0.25mg sq for ctxs. Pt on her side.

## 2023-03-22 NOTE — DISCHARGE INSTRUCTIONS
"Patient Discharge Instructions for Postpartum Women    Resume Regular Diet  Increase activity gradually, no heavy lifting  Shower  No tampons, douching or sexual intercourse.  Discuss birth control options with your physician.  Wear a support bra  Return to work/school when you've been cleared by a physician    Call your physician if     *Fever of 100.4 or higher  *Persistent nausea/ vomiting  *Incisional drainage  *Heavy vaginal bleeding or large clots (Heavy bleeding is soaking 1 pad in an hour)  *Swelling and pain in arms or legs  *Severe headaches, blurred vision or fainting  *Shortness of breath  *Frequency and burning with urination  *Signs of postpartum depression, discuss these signs with your physician    Call lactation services for questions regarding feeding, nipple and breast care, and general questions about lactation.  They can be reached at [phone]         Understanding Postpartum Depression    You've just had a baby.  You know you should be excited and happy.  But instead you find yourself crying for no reason.  You may have trouble coping with your daily tasks.  You feel sad, tired, and hopeless most of the time.  You may even feel ashamed or guilty.  But what you're going through is not your fault and you can feel better.  Talk to your doctor.  He or she can help.    Depression After Childbirth    You may be weepy and tired right after giving birth.  These feelings are normal.  They're sometimes called the "baby blues."  These blues go away 2-3 weeks.  However, postpartum (meaning "after birth") depression lasts much longer and is more sever than the "baby blues."  It can make you feel sad and hopeless.  You may also fear that your baby will be harmed and worry about being a bad mother.      What is Depression?    Depression is a mood disorder that affects the way you think and feel.  The most common symptom is a feeling of deep sadness.  You may also feel as if you just can't cope with life.  "   Other symptoms include:      * Gaining or loosing weight  * Sleeping too much or too little  * Feeling tired all the time  * Feeling restless  * Fears of harming your baby   * Lack of interest in your baby  * Feeling worthless or guilty  * No longer finding pleasure in things you used to  * Having trouble thinking clearly or making decisions  * Thoughts of hurting yourself or your baby    What Causes Postpartum Depression    The exact causes of postpartum depression isn't known.  It may be due to changes in your hormones during and after childbirth.  You may also be tired from caring for your baby and adjusting to being a mother.  All these factors may make you feel depressed.  In some cases, your genes may also play a role.    Depression Can Be Treated    The good news is that there are many ways to treat postpartum depression.  Talking to your doctor is the first step toward feeling better.    Resources:    * National [place] of Mental Health  -- 369.567.3069    www.nimh.nih.gov    * National [place] on Mental Illness --837.425.5975    Www.booker.org    * Mental Health [place] -- 167.629.6180     Www.[facility].org    * National Suicide Hotline --172.407.5760 (800-SUICIDE)    8160-4600 [employer]  All rights reserved.  This information is not intended as a substitute for professional medical care.  Always follow up with your healthcare professional's instructions.       Breastfeeding Discharge Instructions      Feed the baby at the earliest sign of hunger or comfort  Hands to mouth, sucking motions  Rooting or searching for something to suck on  Dont wait for crying - it is a sign of distress    The feedings may be 8-12 times per 24hrs and will not follow a schedule  Avoid pacifiers and bottles for the first 4 weeks  Alternate the breast you start the feeding with, or start with the breast that feels the fullest  Switch breasts when the baby takes himself off the breast or falls asleep  Keep offering  breasts until the baby looks full, no longer gives hunger signs, and stays asleep when placed on his back in the crib  If the baby is sleepy and wont wake for a feeding, put the baby skin-to-skin dressed in a diaper against the mothers bare chest  Sleep near your baby  The baby should be positioned and latched on to the breast correctly  Chest-to-chest, chin in the breast  Babys lips are flipped outward  Babys mouth is stretched open wide like a shout  Babys sucking should feel like tugging to the mother  The baby should be drinking at the breast:  You should hear swallowing or gulping throughout the feeding  You should see milk on the babys lips when he comes off the breast  Your breasts should be softer when the baby is finished feeding  The baby should look relaxed at the end of feedings  After the 4th day and your milk is in:  The babys poop should turn bright yellow and be loose, watery, and seedy  The baby should have at least 3-4 poops the size of the palm of your hand per day  The baby should have at least 5-6 wet diapers per day  The urine should be light yellow in color  You should drink when you are thirsty and eat a healthy diet when you are    hungry.     Take naps to get the rest you need.   Take medications and/or drink alcohol only with permission of your obstetrician    or the babys pediatrician.  You can also call the Infant Risk Center,   ([phone]), Monday-Friday, 8am-5pm Central time, to get the most   up-to-date evidence-based information on the use of medications during   pregnancy and breastfeeding.      The baby should be examined by a pediatrician at 3-5 days of age.  Once your   milk comes in, the baby should be gaining at least ½ - 1oz each day and should be back to birthweight no later than 10-14 days of age.          Community Resources    [facility] Breastfeeding Warmline: 620.392.2931  Local [facility] clinics: provide incentives and breastpumps to eligible  mothers  La Leche League International (LLLI):  mother-to-mother support group website        www.lll.org  Local La Leche League mother-to-mother support groups:        www.llcorwinTruviso.FUJIAN HAIYUAN        La Leche League [facility]   Dr. John Sylvester website for latch videos and general information:        www.breastfeedinginc.ca  Infant Risk Center is a call center that provides information about the safety of taking medications while breastfeeding.  Call 1-105.878.7299, M-F, 8am-5pm, CT.  International Lactation Consultant Association provides resources for assistance:        www.ilca.org  [facility] Breastfeeding Coalition provides informationand resources for parents  and the community    http://[facility].org  Zip code search of breastfeeding resources and more:                                                                              www.LaBreastfeedingSupport.org          Yamilet Elam is a mom-to-mom support group:                             www.nolanesting.com//breastfeedng-support/  Partners for Healthy Babies:  4-029-925-BABY(7748)  [place]: a breastfeeding support group for women of color, 400.150.7872

## 2023-03-22 NOTE — PLAN OF CARE
POC reviewed with pt; able to verbalize acceptance and understanding. Tolerating regular diet. Daisy will be d/c at 0923. Pt states pain goal met with prescribed medications per MD. VSS. Call light in reach. NAD noted. Will continue to monitor.

## 2023-03-22 NOTE — ADDENDUM NOTE
Addendum  created 03/22/23 0631 by Bora Neri MD    Attestation recorded in Intraprocedure, Intraprocedure Attestations filed

## 2023-03-22 NOTE — ANESTHESIA PROCEDURE NOTES
Spinal    Diagnosis: Breech, fetal distress  Patient location during procedure: OR  Start time: 3/22/2023 12:26 AM  Timeout: 3/22/2023 12:26 AM  End time: 3/22/2023 12:28 AM    Staffing  Authorizing Provider: Bora Neri MD  Performing Provider: Zelalem Fisher CRNA    Preanesthetic Checklist  Completed: patient identified, IV checked, site marked, risks and benefits discussed, surgical consent, monitors and equipment checked, pre-op evaluation and timeout performed  Spinal Block  Patient position: sitting  Prep: ChloraPrep  Patient monitoring: heart rate, cardiac monitor, continuous pulse ox and frequent blood pressure checks  Approach: midline  Location: L3-4  Injection technique: single shot  CSF Fluid: clear free-flowing CSF  Needle  Needle type: pencil-tip   Needle gauge: 25 G  Needle length: 3.5 in  Additional Documentation: incremental injection, negative aspiration for heme and no paresthesia on injection  Needle localization: anatomical landmarks  Assessment  Sensory level: T6   Dermatomal levels determined by pinch or prick  Ease of block: easy  Patient's tolerance of the procedure: comfortable throughout block and no complaints

## 2023-03-22 NOTE — NURSING
Pt admitted from er for c/o ctxs. 23y/o  at 38-3 weeks. Pt reports ctxs q hr at 3am; q 7 min since 930pm. +fm, no vb, no rom. Drank 7 bottles of water today, denies sex. Last ate 4pm; tried to eat tonight but vomited it up. Has a headache now. Bp140/78. No hx htn. Placed on external monitors. Ctxs q 2-6; late decels. Will start ivf's. Sve deferred for now.

## 2023-03-22 NOTE — ANESTHESIA POSTPROCEDURE EVALUATION
Anesthesia Post Evaluation    Patient: Tamica E Chaz    Procedure(s) Performed: Procedure(s) (LRB):   SECTION (N/A)    Final Anesthesia Type: epidural      Patient location during evaluation: labor & delivery  Patient participation: Yes- Able to Participate  Level of consciousness: awake and alert and oriented  Post-procedure vital signs: reviewed and stable  Pain management: adequate  Airway patency: patent  ETHAN mitigation strategies: Use of major conduction anesthesia (spinal/epidural) or peripheral nerve block  PONV status at discharge: No PONV  Anesthetic complications: no      Cardiovascular status: hemodynamically stable  Respiratory status: spontaneous ventilation and room air  Hydration status: euvolemic  Follow-up not needed.          Vitals Value Taken Time   /56 23 0123   Temp 37.1 °C (98.7 °F) 23 1139   Pulse 95 23 0129   Resp 18 23 1139   SpO2 98 % 23 0129   Vitals shown include unvalidated device data.      No case tracking events are documented in the log.      Pain/Sejal Score: No data recorded

## 2023-03-22 NOTE — PLAN OF CARE
Note copied from Infant's chart (MRN: [id number])     SOCIAL WORK DISCHARGE PLANNING ASSESSMENT     Sw completed discharge planning assessment with pt's mother in mother's room K347. Pt's mother was easily engaged and education on the role of  was provided. Pt's mother reported all necessities for patient were obtained, including a car seat. Pt's mother reported she has good family support. Pt's paternal grandmother will provide transportation to family home following discharge. Pt's mother was provided education on how to enroll with WIC and education on how to obtain a breast pump through [facility]. No other needs for community resources were reported. Pt's mother was encouraged to call with any questions or concerns. Pt's mother verbalized understanding.         Legal Name: Madonna Smith      :  3/22/2021  Address: [address] [place] [address]  Parent's Phone Numbers: pt's mother Tamica Ware [phone] and pt's father Miguel Smith [phone]     Pediatrician:  Dr. Zelaya             Patient Active Problem List   Diagnosis    Term  delivered by  section, current hospitalization            Birth [facility]           DUY: 23     Birth Weight:   2.69 kg (5 lb 14.9 oz)              Birth Length: 45.7cm               Gestational Age: 38w4d           Apgars    Living status: Living  Apgars:  1 min.:  5 min.:  10 min.:  15 min.:  20 min.:    Skin color:  1  1          Heart rate:  2  2          Reflex irritability:  2  2          Muscle tone:  1  2          Respiratory effort:  2  2          Total:  8  9          Apgars assigned by: HERMES MONTERO RN           23 1449   OB Discharge Planning Assessment   Assessment Type Discharge Planning Assessment   Source of Information family   Verified Demographic and Insurance Information Yes   Insurance Medicaid   Medicaid [place] [facility]   Medicaid Insurance Primary   Spiritual Affiliation  [facility]   Pastoral Care/Clergy/ Contact Status none needed   Father's Involvement Fully Involved   Is Father signing the birth certificate Yes   Father's Address [address] [address]   Family Involvement Moderate   Primary Contact Name and Number pt's mother Tamica Ware [phone] and pt's father Miguel Smith [phone]   Received Prenatal Care Yes   Transportation Anticipated family or friend will provide   Receive [facility] Benefits Not certified, will apply for     Arrangements Self;Family;Friends   Infant Feeding Plan breastfeeding   Breast Pump Needed yes   Does baby have crib or safe sleep space? Yes   Do you have a car seat? Yes   Has other essential care items? Clothing;Bottles;Diapers   Pediatrician Dr. Zelaya   Resources/Education Provided Preparing for Your Baby's Discharge Home;Insurance Coverage of Breast Pumps and Supplies;Breast Pumps through [employer] [place] insurance plan;[facility]   DCFS No indications (Indicators for Report)   Discharge Plan A Home with family

## 2023-03-23 LAB
ALBUMIN SERPL BCP-MCNC: 2.5 G/DL (ref 3.5–5.2)
ALP SERPL-CCNC: 166 U/L (ref 55–135)
ALT SERPL W/O P-5'-P-CCNC: 13 U/L (ref 10–44)
ANION GAP SERPL CALC-SCNC: 8 MMOL/L (ref 8–16)
AST SERPL-CCNC: 24 U/L (ref 10–40)
BASOPHILS # BLD AUTO: 0.04 K/UL (ref 0–0.2)
BASOPHILS NFR BLD: 0.4 % (ref 0–1.9)
BILIRUB SERPL-MCNC: 0.3 MG/DL (ref 0.1–1)
BUN SERPL-MCNC: 5 MG/DL (ref 6–20)
CALCIUM SERPL-MCNC: 8.3 MG/DL (ref 8.7–10.5)
CHLORIDE SERPL-SCNC: 103 MMOL/L (ref 95–110)
CO2 SERPL-SCNC: 23 MMOL/L (ref 23–29)
CREAT SERPL-MCNC: 0.5 MG/DL (ref 0.5–1.4)
DIFFERENTIAL METHOD: ABNORMAL
EOSINOPHIL # BLD AUTO: 0.1 K/UL (ref 0–0.5)
EOSINOPHIL NFR BLD: 0.6 % (ref 0–8)
ERYTHROCYTE [DISTWIDTH] IN BLOOD BY AUTOMATED COUNT: 13.7 % (ref 11.5–14.5)
EST. GFR  (NO RACE VARIABLE): >60 ML/MIN/1.73 M^2
GLUCOSE SERPL-MCNC: 73 MG/DL (ref 70–110)
HCT VFR BLD AUTO: 33.1 % (ref 37–48.5)
HGB BLD-MCNC: 11.4 G/DL (ref 12–16)
IMM GRANULOCYTES # BLD AUTO: 0.15 K/UL (ref 0–0.04)
IMM GRANULOCYTES NFR BLD AUTO: 1.5 % (ref 0–0.5)
LYMPHOCYTES # BLD AUTO: 2.1 K/UL (ref 1–4.8)
LYMPHOCYTES NFR BLD: 20.6 % (ref 18–48)
MCH RBC QN AUTO: 30.1 PG (ref 27–31)
MCHC RBC AUTO-ENTMCNC: 34.4 G/DL (ref 32–36)
MCV RBC AUTO: 87 FL (ref 82–98)
MONOCYTES # BLD AUTO: 0.4 K/UL (ref 0.3–1)
MONOCYTES NFR BLD: 4 % (ref 4–15)
NEUTROPHILS # BLD AUTO: 7.6 K/UL (ref 1.8–7.7)
NEUTROPHILS NFR BLD: 72.9 % (ref 38–73)
NRBC BLD-RTO: 0 /100 WBC
PLATELET # BLD AUTO: 90 K/UL (ref 150–450)
PMV BLD AUTO: 11.3 FL (ref 9.2–12.9)
POTASSIUM SERPL-SCNC: 3.7 MMOL/L (ref 3.5–5.1)
PROT SERPL-MCNC: 6.1 G/DL (ref 6–8.4)
RBC # BLD AUTO: 3.79 M/UL (ref 4–5.4)
SODIUM SERPL-SCNC: 134 MMOL/L (ref 136–145)
WBC # BLD AUTO: 10.34 K/UL (ref 3.9–12.7)

## 2023-03-23 PROCEDURE — 85025 COMPLETE CBC W/AUTO DIFF WBC: CPT | Performed by: OBSTETRICS & GYNECOLOGY

## 2023-03-23 PROCEDURE — 36415 COLL VENOUS BLD VENIPUNCTURE: CPT | Performed by: OBSTETRICS & GYNECOLOGY

## 2023-03-23 PROCEDURE — 99231 PR SUBSEQUENT HOSPITAL CARE,LEVL I: ICD-10-PCS | Mod: ,,, | Performed by: OBSTETRICS & GYNECOLOGY

## 2023-03-23 PROCEDURE — 25000003 PHARM REV CODE 250: Performed by: OBSTETRICS & GYNECOLOGY

## 2023-03-23 PROCEDURE — 99231 SBSQ HOSP IP/OBS SF/LOW 25: CPT | Mod: ,,, | Performed by: OBSTETRICS & GYNECOLOGY

## 2023-03-23 PROCEDURE — 80053 COMPREHEN METABOLIC PANEL: CPT | Performed by: OBSTETRICS & GYNECOLOGY

## 2023-03-23 PROCEDURE — 11000001 HC ACUTE MED/SURG PRIVATE ROOM

## 2023-03-23 RX ORDER — PROCHLORPERAZINE EDISYLATE 5 MG/ML
5 INJECTION INTRAMUSCULAR; INTRAVENOUS EVERY 6 HOURS PRN
Status: DISCONTINUED | OUTPATIENT
Start: 2023-03-23 | End: 2023-03-24 | Stop reason: HOSPADM

## 2023-03-23 RX ORDER — OXYTOCIN/RINGER'S LACTATE 30/500 ML
334 PLASTIC BAG, INJECTION (ML) INTRAVENOUS ONCE AS NEEDED
Status: DISCONTINUED | OUTPATIENT
Start: 2023-03-23 | End: 2023-03-24 | Stop reason: HOSPADM

## 2023-03-23 RX ORDER — MISOPROSTOL 200 UG/1
800 TABLET ORAL ONCE AS NEEDED
Status: DISCONTINUED | OUTPATIENT
Start: 2023-03-23 | End: 2023-03-24 | Stop reason: HOSPADM

## 2023-03-23 RX ORDER — TRANEXAMIC ACID 10 MG/ML
1000 INJECTION, SOLUTION INTRAVENOUS ONCE AS NEEDED
Status: DISCONTINUED | OUTPATIENT
Start: 2023-03-23 | End: 2023-03-24 | Stop reason: HOSPADM

## 2023-03-23 RX ORDER — SODIUM CHLORIDE 0.9 % (FLUSH) 0.9 %
10 SYRINGE (ML) INJECTION
Status: DISCONTINUED | OUTPATIENT
Start: 2023-03-23 | End: 2023-03-24 | Stop reason: HOSPADM

## 2023-03-23 RX ORDER — ONDANSETRON 8 MG/1
8 TABLET, ORALLY DISINTEGRATING ORAL EVERY 8 HOURS PRN
Status: DISCONTINUED | OUTPATIENT
Start: 2023-03-23 | End: 2023-03-24 | Stop reason: HOSPADM

## 2023-03-23 RX ORDER — OXYTOCIN 10 [USP'U]/ML
10 INJECTION, SOLUTION INTRAMUSCULAR; INTRAVENOUS ONCE AS NEEDED
Status: DISCONTINUED | OUTPATIENT
Start: 2023-03-23 | End: 2023-03-24 | Stop reason: HOSPADM

## 2023-03-23 RX ORDER — CARBOPROST TROMETHAMINE 250 UG/ML
250 INJECTION, SOLUTION INTRAMUSCULAR
Status: DISCONTINUED | OUTPATIENT
Start: 2023-03-23 | End: 2023-03-24 | Stop reason: HOSPADM

## 2023-03-23 RX ORDER — LANOLIN ALCOHOL/MO/W.PET/CERES
1 CREAM (GRAM) TOPICAL DAILY
Status: DISCONTINUED | OUTPATIENT
Start: 2023-03-23 | End: 2023-03-24 | Stop reason: HOSPADM

## 2023-03-23 RX ORDER — OXYTOCIN/RINGER'S LACTATE 30/500 ML
95 PLASTIC BAG, INJECTION (ML) INTRAVENOUS ONCE AS NEEDED
Status: DISCONTINUED | OUTPATIENT
Start: 2023-03-23 | End: 2023-03-24 | Stop reason: HOSPADM

## 2023-03-23 RX ORDER — METHYLERGONOVINE MALEATE 0.2 MG/ML
200 INJECTION INTRAVENOUS
Status: DISCONTINUED | OUTPATIENT
Start: 2023-03-23 | End: 2023-03-24 | Stop reason: HOSPADM

## 2023-03-23 RX ADMIN — OXYCODONE HYDROCHLORIDE AND ACETAMINOPHEN 1 TABLET: 5; 325 TABLET ORAL at 01:03

## 2023-03-23 RX ADMIN — OXYCODONE AND ACETAMINOPHEN 1 TABLET: 10; 325 TABLET ORAL at 09:03

## 2023-03-23 RX ADMIN — MUPIROCIN: 20 OINTMENT TOPICAL at 09:03

## 2023-03-23 RX ADMIN — DOCUSATE SODIUM 200 MG: 100 CAPSULE, LIQUID FILLED ORAL at 08:03

## 2023-03-23 RX ADMIN — FERROUS SULFATE TAB 325 MG (65 MG ELEMENTAL FE) 1 EACH: 325 (65 FE) TAB at 08:03

## 2023-03-23 RX ADMIN — DOCUSATE SODIUM 200 MG: 100 CAPSULE, LIQUID FILLED ORAL at 09:03

## 2023-03-23 RX ADMIN — IBUPROFEN 800 MG: 400 TABLET ORAL at 01:03

## 2023-03-23 RX ADMIN — IBUPROFEN 800 MG: 400 TABLET ORAL at 08:03

## 2023-03-23 RX ADMIN — PRENATAL VIT W/ FE FUMARATE-FA TAB 27-0.8 MG 1 TABLET: 27-0.8 TAB at 08:03

## 2023-03-23 RX ADMIN — IBUPROFEN 800 MG: 400 TABLET ORAL at 05:03

## 2023-03-23 RX ADMIN — OXYCODONE AND ACETAMINOPHEN 1 TABLET: 10; 325 TABLET ORAL at 05:03

## 2023-03-23 RX ADMIN — OXYCODONE HYDROCHLORIDE AND ACETAMINOPHEN 1 TABLET: 5; 325 TABLET ORAL at 08:03

## 2023-03-23 NOTE — PROGRESS NOTES
2023      Patient is doing well with no complaints. Tolerating Po without N/V. Not passing flatus yet. Ambulated without difficulty. Pain controlled with pain medications. Lochia is less than menses. Is breastfeeding. Denies Ha/RUQ pain or scotoma    Temp:  [97.9 °F (36.6 °C)-98.1 °F (36.7 °C)] 98 °F (36.7 °C)  Pulse:  [61-77] 77  Resp:  [16-18] 16  SpO2:  [96 %-98 %] 97 %  BP: ()/(55-92) 110/64      In bed, NAD, RRR, CTA B, abd S/NT/ND + BS FF and below umbilicus, dressing c//d/i ext: no edema or tenderness     A/P: 22 y.o.   s/p PLTCD PPD 2   With pre eclampsia     1. Continue routine care  2. Pre-eclampsia: labs are stable, no severe range blood pressures, was started on procardia yesterday but overnight blood pressures were low, will hold procardia today and monitor and restart labetalol if needed.   3. ITP: stable   4. Anemia: stable, restart iron when bowel function returns

## 2023-03-23 NOTE — LACTATION NOTE
This note was copied from a baby's chart.  Rounded on couplet at this time. Mother reports breastfeeding going well now. States she has inverted nipples and needed nipple shield however reports last feeding went well without nipple shield. Baby swaddled and asleep in arms. Appears satiated. Mother reports last feeding around 1645. Denies any questions or needs at this time. Encouraged to call PRN.

## 2023-03-23 NOTE — LACTATION NOTE
[facility]  Lactation Note - Mom    SUMMARY     Maternal Assessment    Breast Size Issue: none  Breast Shape: Bilateral:, round  Breast Density: Bilateral:, soft  Areola: Bilateral:, elastic  Nipples: Bilateral:, everted  Left Nipple Symptoms: bruised, tender  Right Nipple Symptoms: bruised, tender      LATCH Score         Breasts WDL    Breast WDL: WDL except, nipple symptoms  Left Nipple Symptoms: bruised, tender  Right Nipple Symptoms: bruised, tender    Maternal Infant Feeding    Maternal Preparation: breast care  Maternal Emotional State: relaxed  Pain with Feeding: yes (3-4/10 per mom; enc deeper latch)  Pain Location: nipples, bilateral  Pain Description: soreness  Comfort Measures Following Feeding: air-drying encouraged, expressed milk applied  Latch Assistance: no    Lactation Referrals         Lactation Interventions    Breast Care: Breastfeeding: breast milk to nipples, lanolin to nipples, milk massaged towards nipple  Breastfeeding Assistance: feeding cue recognition promoted, feeding on demand promoted, hand expression verified, support offered  Breast Care: Breastfeeding: breast milk to nipples, lanolin to nipples, milk massaged towards nipple  Breastfeeding Assistance: feeding cue recognition promoted, feeding on demand promoted, hand expression verified, support offered  Breastfeeding Support: diary/feeding log utilized, encouragement provided, lactation counseling provided, maternal hydration promoted, maternal nutrition promoted, maternal rest encouraged       Breastfeeding Session         Maternal Information

## 2023-03-23 NOTE — PLAN OF CARE
Rounded on pt. Mom has been exclusively BR. Praise & reassurance provided. Hand expresses colostrum easily. Encouraged to allow to air dry on nipples to decrease pain. Encouraged deeper latch. Mild compression stripes noted bilat. Offered assistance-denies need at this time. Mom will continue to exclusively breastfeed frequently & on cue at least 8+ times/24 hrs.  Will monitor for signs of deep latch & adequate fdg.  Will have baby's weight checked at ped's office in the next couple of days after d/c from hospital as recommended. Questions answered. Instructed to call for any questions/needs. Verbalized understanding.

## 2023-03-24 VITALS
DIASTOLIC BLOOD PRESSURE: 87 MMHG | OXYGEN SATURATION: 99 % | RESPIRATION RATE: 18 BRPM | HEART RATE: 90 BPM | TEMPERATURE: 98 F | SYSTOLIC BLOOD PRESSURE: 122 MMHG

## 2023-03-24 PROCEDURE — 99238 HOSP IP/OBS DSCHRG MGMT 30/<: CPT | Mod: ,,, | Performed by: STUDENT IN AN ORGANIZED HEALTH CARE EDUCATION/TRAINING PROGRAM

## 2023-03-24 PROCEDURE — 25000003 PHARM REV CODE 250: Performed by: OBSTETRICS & GYNECOLOGY

## 2023-03-24 PROCEDURE — 99238 PR HOSPITAL DISCHARGE DAY,<30 MIN: ICD-10-PCS | Mod: ,,, | Performed by: STUDENT IN AN ORGANIZED HEALTH CARE EDUCATION/TRAINING PROGRAM

## 2023-03-24 RX ORDER — IBUPROFEN 800 MG/1
800 TABLET ORAL EVERY 8 HOURS PRN
Qty: 60 TABLET | Refills: 1 | Status: SHIPPED | OUTPATIENT
Start: 2023-03-24

## 2023-03-24 RX ORDER — OXYCODONE AND ACETAMINOPHEN 5; 325 MG/1; MG/1
1 TABLET ORAL EVERY 4 HOURS PRN
Qty: 25 TABLET | Refills: 0 | Status: SHIPPED | OUTPATIENT
Start: 2023-03-24

## 2023-03-24 RX ADMIN — FERROUS SULFATE TAB 325 MG (65 MG ELEMENTAL FE) 1 EACH: 325 (65 FE) TAB at 08:03

## 2023-03-24 RX ADMIN — IBUPROFEN 800 MG: 400 TABLET ORAL at 09:03

## 2023-03-24 RX ADMIN — OXYCODONE AND ACETAMINOPHEN 1 TABLET: 10; 325 TABLET ORAL at 01:03

## 2023-03-24 RX ADMIN — IBUPROFEN 800 MG: 400 TABLET ORAL at 01:03

## 2023-03-24 RX ADMIN — OXYCODONE AND ACETAMINOPHEN 1 TABLET: 10; 325 TABLET ORAL at 10:03

## 2023-03-24 RX ADMIN — OXYCODONE AND ACETAMINOPHEN 1 TABLET: 10; 325 TABLET ORAL at 05:03

## 2023-03-24 RX ADMIN — MUPIROCIN: 20 OINTMENT TOPICAL at 09:03

## 2023-03-24 RX ADMIN — DOCUSATE SODIUM 200 MG: 100 CAPSULE, LIQUID FILLED ORAL at 08:03

## 2023-03-24 RX ADMIN — PRENATAL VIT W/ FE FUMARATE-FA TAB 27-0.8 MG 1 TABLET: 27-0.8 TAB at 08:03

## 2023-03-24 NOTE — PROGRESS NOTES
2023      Patient is doing well with no complaints. Tolerating Po without N/V. Not passing flatus yet. Ambulated without difficulty. Pain controlled with pain medications. Lochia is less than menses. Is breastfeeding. Denies Ha/RUQ pain or scotoma    Temp:  [97.8 °F (36.6 °C)-98.9 °F (37.2 °C)] 97.8 °F (36.6 °C)  Pulse:  [] 90  Resp:  [16-18] 18  SpO2:  [97 %-99 %] 99 %  BP: (122-134)/(78-88) 122/87      In bed, NAD, RRR, CTA B, abd S/NT/ND + BS FF and below umbilicus, dressing c//d/i ext: no edema or tenderness     A/P: 22 y.o.   s/p PLTCD PPD 2   With pre eclampsia     1. Continue routine care  2. Pre-eclampsia: labs are stable, no severe range blood pressures and blood pressures appropriate without antihypertensive.   3. ITP: stable   4. Anemia: stable, restart iron when bowel function returns    Patient meeting all appropriate postoperative/postpartum milestones and is stable for discharge.     Alexia Velázquez M.D.  OB/GYN  [facility]

## 2023-03-24 NOTE — NURSING
S/L to L wrist removed with cathalone intact. No redness, swelling, or pain noted to site. Bandaid applied. Pt tolerated well.

## 2023-03-24 NOTE — DISCHARGE SUMMARY
Delivery Discharge Summary  Obstetrics      Primary OB Clinician: Sarah Espinal MD      Admission date: 3/21/2023  Discharge date: 2023    Disposition: To home, self care    Discharge Diagnosis List:      Patient Active Problem List   Diagnosis    ADD (attention deficit disorder)    Thrombocytopenia    Bruising    Petechiae    Environmental allergies    Neutropenia    Enlarged tonsils    Chronic sinusitis    Chronic tonsillitis    School failure    Depression    Anxiety    Chronic ITP (idiopathic thrombocytopenia)    Iron deficiency    Iron deficiency anemia during pregnancy    Maternal iron deficiency anemia complicating pregnancy in second trimester    Immune thrombocytopenia affecting pregnancy in third trimester    Breech presentation of fetus    Fetal growth restriction antepartum    Pre-eclampsia     delivery delivered       Procedure: , due to breech presentation    Hospital Course:  Tamica Ware is a 22 y.o. now , POD #2 who was admitted on 3/21/2023 at 38w4d for uterine contractions an underwent 1 1LTCS given breech presentation. Patient was subsequently admitted to labor and delivery unit with signed consents.This IUP was complicated by ITP, Anemia, and IUGR. She underwent a 1LTCS. Please see delivery note for further details. Her postpartum course wasuncomplicated. On discharge day, patient's pain is controlled with oral pain medications. Pt is tolerating ambulation without SOB or CP, and regular diet without N/V. Reports lochia is mild. Denies any HA, vision changes, F/C, LE swelling. Denies any breast pain/soreness. Anemia and platelets stable. BP well controlled without meds. No Pre E symptoms.     Pt in stable condition and ready for discharge. She has been instructed to start and/or continue medications and follow up with her obstetrics provider as listed below.    Pertinent studies:  CBC  Recent Labs   Lab 23  0003 23  1216 23  0542   WBC 10.14 12.66  10.34   HGB 12.5 11.3* 11.4*   HCT 35.7* 32.2* 33.1*   MCV 87 86 87   PLT 94* 86* 90*          Immunization History   Administered Date(s) Administered    HPV 9-Valent 2018    HPV Quadrivalent 2013    Hepatitis A 2013    Hepatitis A, Pediatric/Adolescent, 2 Dose 2018    Influenza - Intranasal 2013    Influenza - Quadrivalent - PF *Preferred* (6 months and older) 2017, 10/17/2018    Meningococcal Conjugate 2013    Meningococcal Conjugate (MCV4P) 2018    PPD Test 2018    Tdap 2013, 2023        Delivery:    Episiotomy: None   Lacerations: None   Repair suture: None   Repair # of packets:     Blood loss (ml):       Birth information:  YOB: 2021   Time of birth: 12:38 AM   Sex: female   Delivery type: , Low Transverse   Gestational Age: 38w4d    Delivery Clinician:      Other providers:       Additional  information:  Forceps:    Vacuum:    Breech:    Observed anomalies      Living?:           APGARS  One minute Five minutes Ten minutes   Skin color:         Heart rate:         Grimace:         Muscle tone:         Breathing:         Totals: 8  9        Placenta: Delivered:       appearance    Patient Instructions:   Current Discharge Medication List        START taking these medications    Details   ibuprofen (ADVIL,MOTRIN) 800 MG tablet Take 1 tablet (800 mg total) by mouth every 8 (eight) hours as needed.  Qty: 60 tablet, Refills: 1      oxyCODONE-acetaminophen (PERCOCET) 5-325 mg per tablet Take 1 tablet by mouth every 4 (four) hours as needed for Pain.  Qty: 25 tablet, Refills: 0    Comments: Quantity prescribed more than 7 day supply? No           CONTINUE these medications which have NOT CHANGED    Details   ferrous sulfate (FEOSOL) 325 mg (65 mg iron) Tab tablet Take 1 tablet (325 mg total) by mouth once daily.  Qty: 90 tablet, Refills: 3    Associated Diagnoses: Iron deficiency      PNV cmb 52/iron/FA/omega-3/dha (COMPLETE  YUNG DHA ORAL) Take by mouth.             Discharge Procedure Orders   BREAST PUMP FOR HOME USE     Order Specific Question Answer Comments   Type of pump: Electric    Weight: 165 lb    Length of need (1-99 months): 99      Pelvic Rest   Order Comments: Pelvic Rest - Nothing in the Vagina for 6 weeks.     Notify your health care provider if you experience any of the following:   Order Comments: Vaginal Bleeding greater than a pad per hour.     Notify your health care provider if you experience any of the following:  increased confusion or weakness     Notify your health care provider if you experience any of the following:  persistent dizziness, light-headedness, or visual disturbances     Notify your health care provider if you experience any of the following:  worsening rash     Notify your health care provider if you experience any of the following:  severe persistent headache     Notify your health care provider if you experience any of the following:  difficulty breathing or increased cough     Notify your health care provider if you experience any of the following:  redness, tenderness, or signs of infection (pain, swelling, redness, odor or green/yellow discharge around incision site)     Notify your health care provider if you experience any of the following:  severe uncontrolled pain     Notify your health care provider if you experience any of the following:  persistent nausea and vomiting or diarrhea     Notify your health care provider if you experience any of the following:  temperature >100.4     Activity as tolerated        Follow-up Information       Sarah Espinal MD. Schedule an appointment as soon as possible for a visit.    Specialty: Obstetrics and Gynecology  Contact information:  [address] [place] [address]  [phone]                              Alexia Velázquez M.D.  OB/GYN  [facility]

## 2023-03-24 NOTE — NURSING
VSS. Discharge instructions reviewed with pt and family at the bedside. Pt verbalized understanding. Questions encouraged and answered. Pt reports having no complaints at this time.

## 2023-03-24 NOTE — LACTATION NOTE
"Rounded on couplet. Mom requested assistance with breastfeeding at this time.  Encouraged awakening techniques, such as unswaddling/undressing, changing baby's diaper, and placing baby skin to skin. Asked mom if she knew how to hand express and she stated yes. Large drops of colostrum observed to right nipple. Assisted mom with providing pillow support and placed baby in football position. Instructed mom to apply nipple to baby's upper lip/nose and wait for baby to open mouth wide for deep latch. Baby latched and began heartily sucking with swallowing observed.  Baby required some gentle stroking of hands and feet to stay awake. Educated mom about importance of ensuring deep latch and watching for efficient sucks/swallowing.     Momr eports that she has been able to latch baby deeply even without shield. Discussed shield use and how it can impact milk supply. Encouraged mom to use shield when necessary to help baby latch, but to remember to pump afterwards.   Mom reports nipples are still slightly tender but feeling "much better" today.   Reviewed baby's current weight loss of -5.95%. Encouraged mom to hand express extra drops into baby's mouth following feeds to provide baby with extra nourishment.  Discharge teaching reviewed. Mom verbalized understanding.     [place] - [facility]  Lactation Note - Mom    SUMMARY     Maternal Assessment    Breast Size Issue: none  Breast Shape: Bilateral:, round  Breast Density: Bilateral: ("harder")  Areola: Bilateral:, elastic  Nipples: Bilateral:, everted  Left Nipple Symptoms: tender  Right Nipple Symptoms: tender      LATCH Score         Breasts WDL    Breast WDL: WDL  Left Nipple Symptoms: tender  Right Nipple Symptoms: tender    Maternal Infant Feeding    Maternal Preparation: breast care, hand hygiene  Maternal Emotional State: assist needed  Infant Positioning: clutch/football  Signs of Milk Transfer: audible swallow, infant jaw motion present, suck/swallow ratio  Pain " with Feeding: no  Pain Location: nipples, bilateral  Pain Description: soreness  Comfort Measures Before/During Feeding: infant position adjusted, latch adjusted  Comfort Measures Following Feeding: air-drying encouraged, expressed milk applied  Latch Assistance: yes    Lactation Referrals    Community Referrals: outpatient lactation program, pediatric care provider, support group  Outpatient Lactation Program Lactation Follow-up Date/Time: Call lact ctr prn  Pediatric Care Provider Lactation Follow-up Date/Time: Follow up with peds within 2 days for wt check  Support Group Lactation Follow-up Date/Time: Community resources given in Bf guide    Lactation Interventions    Breast Care: Breastfeeding: breast milk to nipples, open to air  Breastfeeding Assistance: assisted with positioning, assisted with techniques for flat/inverted nipples, feeding cue recognition promoted, feeding on demand promoted, feeding session observed, hand expression verified, infant latch-on verified, infant stimulated to wakeful state, infant suck/swallow verified, support offered  Breast Care: Breastfeeding: breast milk to nipples, open to air  Breastfeeding Assistance: assisted with positioning, assisted with techniques for flat/inverted nipples, feeding cue recognition promoted, feeding on demand promoted, feeding session observed, hand expression verified, infant latch-on verified, infant stimulated to wakeful state, infant suck/swallow verified, support offered  Breastfeeding Support: diary/feeding log utilized, encouragement provided, lactation counseling provided, maternal hydration promoted, maternal nutrition promoted, maternal rest encouraged       Breastfeeding Session    Infant Positioning: clutch/football  Signs of Milk Transfer: audible swallow, infant jaw motion present, suck/swallow ratio    Maternal Information

## 2023-03-24 NOTE — PLAN OF CARE
VSS. Medicated for pain PRN and reports relief .  Encouraged mother to feed infant 8 or more times in 24 hour period and on demand feedings.  Mother verbalized understanding.  Mother bonding appropriately with infant.

## 2023-03-28 ENCOUNTER — POSTPARTUM VISIT (OUTPATIENT)
Dept: OBSTETRICS AND GYNECOLOGY | Facility: CLINIC | Age: 23
End: 2023-03-28
Payer: MEDICAID

## 2023-03-28 VITALS
BODY MASS INDEX: 21.76 KG/M2 | SYSTOLIC BLOOD PRESSURE: 122 MMHG | WEIGHT: 152 LBS | DIASTOLIC BLOOD PRESSURE: 74 MMHG | HEIGHT: 70 IN

## 2023-03-28 LAB
FINAL PATHOLOGIC DIAGNOSIS: NORMAL
GROSS: NORMAL
Lab: NORMAL

## 2023-03-28 PROCEDURE — 99999 PR PBB SHADOW E&M-EST. PATIENT-LVL III: ICD-10-PCS | Mod: PBBFAC,,, | Performed by: OBSTETRICS & GYNECOLOGY

## 2023-03-28 PROCEDURE — 99213 OFFICE O/P EST LOW 20 MIN: CPT | Mod: PBBFAC,PO | Performed by: OBSTETRICS & GYNECOLOGY

## 2023-03-28 PROCEDURE — 59430 PR CARE AFTER DELIVERY ONLY: ICD-10-PCS | Mod: ,,, | Performed by: OBSTETRICS & GYNECOLOGY

## 2023-03-28 PROCEDURE — 99999 PR PBB SHADOW E&M-EST. PATIENT-LVL III: CPT | Mod: PBBFAC,,, | Performed by: OBSTETRICS & GYNECOLOGY

## 2023-03-28 NOTE — PROGRESS NOTES
CC: follow up delivery    HPI:   Tamica Ware is a 22 y.o. here for f/u  complicated by pre-eclampsia. She reports normal bowel movements and urination. Pain is controlled with OTC medications. She is breastfeeding. Desires pills or depo for contraception. Denies depression. Bleeding light.     Vitals:    23 1506   BP: 122/74       PHYSICAL EXAM:   APPEARANCE: Well nourished, well developed, in no acute distress.  ABDOMEN: Soft. No tenderness or masses. No hernias. well healed Pfannenstiel incision  PELVIC: deferred    PLTCD    PLAN:   1. OK to slowly resume normal activities but pelvic rest. Contraception discussed with patient. Patient desires either depo or pills, we discussed risks/benefits/side effects of both.  2. Return to clinic in 5 weeks

## 2025-04-13 NOTE — NURSING
Pt received IV Venofer infusion dose 3/4. Pt tolerated it well. AVS given.  Pt will follow up with MD. Discharged with no acute distress.   
Attending Only

## (undated) DEVICE — SEE MEDLINE ITEM 152496

## (undated) DEVICE — BLADE RED 40 ADENOID

## (undated) DEVICE — SEE MEDLINE ITEM 157117

## (undated) DEVICE — SUCTION COAGULATOR 10FR 6IN

## (undated) DEVICE — DRESSING AQUACEL AG ADV 3.5X12

## (undated) DEVICE — PACK TONSIL CUSTOM

## (undated) DEVICE — PENCIL ROCKER SWITCH 10FT CORD

## (undated) DEVICE — CATH ALL PUR URTHL RR 10FR

## (undated) DEVICE — ELECTRODE REM PLYHSV RETURN 9